# Patient Record
Sex: FEMALE | Race: WHITE | NOT HISPANIC OR LATINO | Employment: FULL TIME | URBAN - METROPOLITAN AREA
[De-identification: names, ages, dates, MRNs, and addresses within clinical notes are randomized per-mention and may not be internally consistent; named-entity substitution may affect disease eponyms.]

---

## 2023-03-15 DIAGNOSIS — E78.00 HYPERCHOLESTEROLEMIA: Primary | ICD-10-CM

## 2023-03-15 RX ORDER — EZETIMIBE 10 MG/1
1 TABLET ORAL NIGHTLY
COMMUNITY
Start: 2020-02-27 | End: 2023-03-15 | Stop reason: SDUPTHER

## 2023-03-15 RX ORDER — EZETIMIBE 10 MG/1
10 TABLET ORAL NIGHTLY
Qty: 90 TABLET | Refills: 0 | Status: SHIPPED | OUTPATIENT
Start: 2023-03-15 | End: 2023-05-30

## 2023-04-14 LAB
ALANINE AMINOTRANSFERASE (SGPT) (U/L) IN SER/PLAS: 25 U/L (ref 7–45)
ALBUMIN (G/DL) IN SER/PLAS: 4.4 G/DL (ref 3.4–5)
ALKALINE PHOSPHATASE (U/L) IN SER/PLAS: 69 U/L (ref 33–136)
ANION GAP IN SER/PLAS: 15 MMOL/L (ref 10–20)
ASPARTATE AMINOTRANSFERASE (SGOT) (U/L) IN SER/PLAS: 22 U/L (ref 9–39)
BILIRUBIN TOTAL (MG/DL) IN SER/PLAS: 0.5 MG/DL (ref 0–1.2)
CALCIDIOL (25 OH VITAMIN D3) (NG/ML) IN SER/PLAS: 41 NG/ML
CALCIUM (MG/DL) IN SER/PLAS: 10.1 MG/DL (ref 8.6–10.6)
CARBON DIOXIDE, TOTAL (MMOL/L) IN SER/PLAS: 26 MMOL/L (ref 21–32)
CHLORIDE (MMOL/L) IN SER/PLAS: 102 MMOL/L (ref 98–107)
CHOLESTEROL (MG/DL) IN SER/PLAS: 232 MG/DL (ref 0–199)
CHOLESTEROL IN HDL (MG/DL) IN SER/PLAS: 61.2 MG/DL
CHOLESTEROL/HDL RATIO: 3.8
COBALAMIN (VITAMIN B12) (PG/ML) IN SER/PLAS: 1166 PG/ML (ref 211–911)
CREATININE (MG/DL) IN SER/PLAS: 1.03 MG/DL (ref 0.5–1.05)
ERYTHROCYTE DISTRIBUTION WIDTH (RATIO) BY AUTOMATED COUNT: 12.1 % (ref 11.5–14.5)
ERYTHROCYTE MEAN CORPUSCULAR HEMOGLOBIN CONCENTRATION (G/DL) BY AUTOMATED: 32.7 G/DL (ref 32–36)
ERYTHROCYTE MEAN CORPUSCULAR VOLUME (FL) BY AUTOMATED COUNT: 102 FL (ref 80–100)
ERYTHROCYTES (10*6/UL) IN BLOOD BY AUTOMATED COUNT: 3.98 X10E12/L (ref 4–5.2)
ESTIMATED AVERAGE GLUCOSE FOR HBA1C: 120 MG/DL
GFR FEMALE: 60 ML/MIN/1.73M2
GLUCOSE (MG/DL) IN SER/PLAS: 107 MG/DL (ref 74–99)
HEMATOCRIT (%) IN BLOOD BY AUTOMATED COUNT: 40.4 % (ref 36–46)
HEMOGLOBIN (G/DL) IN BLOOD: 13.2 G/DL (ref 12–16)
HEMOGLOBIN A1C/HEMOGLOBIN TOTAL IN BLOOD: 5.8 %
LDL: 136 MG/DL (ref 0–99)
LEUKOCYTES (10*3/UL) IN BLOOD BY AUTOMATED COUNT: 4.4 X10E9/L (ref 4.4–11.3)
NRBC (PER 100 WBCS) BY AUTOMATED COUNT: 0 /100 WBC (ref 0–0)
PLATELETS (10*3/UL) IN BLOOD AUTOMATED COUNT: 301 X10E9/L (ref 150–450)
POTASSIUM (MMOL/L) IN SER/PLAS: 4.6 MMOL/L (ref 3.5–5.3)
PROTEIN TOTAL: 6.9 G/DL (ref 6.4–8.2)
SODIUM (MMOL/L) IN SER/PLAS: 138 MMOL/L (ref 136–145)
THYROTROPIN (MIU/L) IN SER/PLAS BY DETECTION LIMIT <= 0.05 MIU/L: 3.2 MIU/L (ref 0.44–3.98)
TRIGLYCERIDE (MG/DL) IN SER/PLAS: 173 MG/DL (ref 0–149)
UREA NITROGEN (MG/DL) IN SER/PLAS: 17 MG/DL (ref 6–23)
VLDL: 35 MG/DL (ref 0–40)

## 2023-05-03 ENCOUNTER — OFFICE VISIT (OUTPATIENT)
Dept: PRIMARY CARE | Facility: CLINIC | Age: 63
End: 2023-05-03
Payer: COMMERCIAL

## 2023-05-03 VITALS
DIASTOLIC BLOOD PRESSURE: 66 MMHG | SYSTOLIC BLOOD PRESSURE: 128 MMHG | HEART RATE: 72 BPM | WEIGHT: 161 LBS | BODY MASS INDEX: 26.82 KG/M2 | HEIGHT: 65 IN | RESPIRATION RATE: 18 BRPM

## 2023-05-03 DIAGNOSIS — Z71.2 ENCOUNTER TO DISCUSS TEST RESULTS: ICD-10-CM

## 2023-05-03 DIAGNOSIS — D75.89 MACROCYTOSIS: ICD-10-CM

## 2023-05-03 DIAGNOSIS — E03.8 OTHER SPECIFIED HYPOTHYROIDISM: ICD-10-CM

## 2023-05-03 DIAGNOSIS — Z83.3 FAMILY HISTORY OF DIABETES MELLITUS IN SISTER: ICD-10-CM

## 2023-05-03 DIAGNOSIS — E78.2 HYPERLIPIDEMIA, MIXED: ICD-10-CM

## 2023-05-03 DIAGNOSIS — F41.1 GENERALIZED ANXIETY DISORDER: ICD-10-CM

## 2023-05-03 DIAGNOSIS — E03.9 HYPOTHYROIDISM, UNSPECIFIED TYPE: ICD-10-CM

## 2023-05-03 DIAGNOSIS — I25.10 CORONARY ARTERY CALCIFICATION SEEN ON CAT SCAN: ICD-10-CM

## 2023-05-03 DIAGNOSIS — R73.09 ELEVATED HEMOGLOBIN A1C: Primary | ICD-10-CM

## 2023-05-03 DIAGNOSIS — E55.9 VITAMIN D DEFICIENCY: ICD-10-CM

## 2023-05-03 DIAGNOSIS — I10 PRIMARY HYPERTENSION: ICD-10-CM

## 2023-05-03 DIAGNOSIS — T46.6X5A ADVERSE EFFECT OF STATIN: ICD-10-CM

## 2023-05-03 DIAGNOSIS — E78.00 ELEVATED LDL CHOLESTEROL LEVEL: ICD-10-CM

## 2023-05-03 DIAGNOSIS — Z51.81 MEDICATION MONITORING ENCOUNTER: ICD-10-CM

## 2023-05-03 DIAGNOSIS — N95.2 ATROPHIC VAGINITIS: ICD-10-CM

## 2023-05-03 DIAGNOSIS — R73.01 IMPAIRED FASTING GLUCOSE: ICD-10-CM

## 2023-05-03 PROBLEM — M16.12 OSTEOARTHRITIS OF LEFT HIP: Status: ACTIVE | Noted: 2021-06-08

## 2023-05-03 PROBLEM — S83.242A TEAR OF MEDIAL MENISCUS OF LEFT KNEE, CURRENT: Status: RESOLVED | Noted: 2023-05-03 | Resolved: 2023-05-03

## 2023-05-03 PROBLEM — F17.201 NICOTINE DEPENDENCE IN REMISSION: Status: ACTIVE | Noted: 2023-05-03

## 2023-05-03 PROBLEM — E78.5 HYPERLIPIDEMIA: Status: ACTIVE | Noted: 2022-04-05

## 2023-05-03 PROBLEM — D72.819 LEUKOPENIA: Status: ACTIVE | Noted: 2023-05-03

## 2023-05-03 PROBLEM — R92.30 DENSE BREASTS: Status: ACTIVE | Noted: 2023-05-03

## 2023-05-03 PROBLEM — R92.1 BREAST CALCIFICATION, RIGHT: Status: ACTIVE | Noted: 2023-05-03

## 2023-05-03 PROBLEM — R53.83 FATIGUE: Status: RESOLVED | Noted: 2023-05-03 | Resolved: 2023-05-03

## 2023-05-03 PROBLEM — S23.41XA SPRAIN OF RIBS: Status: RESOLVED | Noted: 2023-05-03 | Resolved: 2023-05-03

## 2023-05-03 PROBLEM — I49.9 DISORDER OF HEART RHYTHM: Status: RESOLVED | Noted: 2023-05-03 | Resolved: 2023-05-03

## 2023-05-03 PROBLEM — F41.9 ANXIETY AND DEPRESSION: Status: RESOLVED | Noted: 2022-04-05 | Resolved: 2023-05-03

## 2023-05-03 PROBLEM — R45.82 WORRIES: Status: RESOLVED | Noted: 2023-05-03 | Resolved: 2023-05-03

## 2023-05-03 PROBLEM — K63.5 HYPERPLASTIC COLON POLYP: Status: RESOLVED | Noted: 2023-05-03 | Resolved: 2023-05-03

## 2023-05-03 PROBLEM — M54.42 ACUTE LEFT-SIDED LOW BACK PAIN WITH LEFT-SIDED SCIATICA: Status: RESOLVED | Noted: 2021-03-16 | Resolved: 2023-05-03

## 2023-05-03 PROBLEM — N60.19 FIBROCYSTIC BREAST DISEASE (FCBD) IN FEMALE: Status: ACTIVE | Noted: 2023-05-03

## 2023-05-03 PROBLEM — M19.90 ARTHRITIS: Status: RESOLVED | Noted: 2023-05-03 | Resolved: 2023-05-03

## 2023-05-03 PROBLEM — N83.201 OVARIAN CYST, RIGHT: Status: RESOLVED | Noted: 2023-05-03 | Resolved: 2023-05-03

## 2023-05-03 PROBLEM — J31.0 RHINITIS: Status: RESOLVED | Noted: 2023-05-03 | Resolved: 2023-05-03

## 2023-05-03 PROBLEM — D64.9 ANEMIA FOLLOWING SURGERY: Status: RESOLVED | Noted: 2023-05-03 | Resolved: 2023-05-03

## 2023-05-03 PROBLEM — F32.A ANXIETY AND DEPRESSION: Status: RESOLVED | Noted: 2022-04-05 | Resolved: 2023-05-03

## 2023-05-03 PROBLEM — M25.552 PAIN IN LEFT HIP: Status: RESOLVED | Noted: 2021-03-16 | Resolved: 2023-05-03

## 2023-05-03 PROBLEM — R92.2 DENSE BREASTS: Status: ACTIVE | Noted: 2023-05-03

## 2023-05-03 PROBLEM — D72.819 LEUKOPENIA: Status: RESOLVED | Noted: 2023-05-03 | Resolved: 2023-05-03

## 2023-05-03 PROBLEM — D64.9 ANEMIA FOLLOWING SURGERY: Status: ACTIVE | Noted: 2023-05-03

## 2023-05-03 PROBLEM — M79.2 ARM NEURALGIA: Status: RESOLVED | Noted: 2023-05-03 | Resolved: 2023-05-03

## 2023-05-03 PROBLEM — M54.50 LUMBAR PAIN: Status: RESOLVED | Noted: 2023-05-03 | Resolved: 2023-05-03

## 2023-05-03 PROBLEM — Z96.649 S/P HIP REPLACEMENT: Status: ACTIVE | Noted: 2023-05-03

## 2023-05-03 PROCEDURE — 3074F SYST BP LT 130 MM HG: CPT | Performed by: INTERNAL MEDICINE

## 2023-05-03 PROCEDURE — 3078F DIAST BP <80 MM HG: CPT | Performed by: INTERNAL MEDICINE

## 2023-05-03 PROCEDURE — 99214 OFFICE O/P EST MOD 30 MIN: CPT | Performed by: INTERNAL MEDICINE

## 2023-05-03 PROCEDURE — 1036F TOBACCO NON-USER: CPT | Performed by: INTERNAL MEDICINE

## 2023-05-03 RX ORDER — BUPROPION HYDROCHLORIDE 300 MG/1
1 TABLET ORAL DAILY
COMMUNITY
Start: 2013-02-25 | End: 2023-07-28 | Stop reason: SDUPTHER

## 2023-05-03 RX ORDER — PITAVASTATIN CALCIUM 1.04 MG/1
1 TABLET, FILM COATED ORAL DAILY
Qty: 30 TABLET | Refills: 3 | Status: SHIPPED | OUTPATIENT
Start: 2023-05-03 | End: 2023-05-03 | Stop reason: SDUPTHER

## 2023-05-03 RX ORDER — PITAVASTATIN CALCIUM 1.04 MG/1
1 TABLET, FILM COATED ORAL DAILY
Qty: 30 TABLET | Refills: 3 | Status: SHIPPED | OUTPATIENT
Start: 2023-05-03 | End: 2023-08-02 | Stop reason: SDUPTHER

## 2023-05-03 RX ORDER — ESTRADIOL 0.1 MG/G
2 CREAM VAGINAL DAILY
Qty: 42.5 G | Refills: 1 | Status: SHIPPED | OUTPATIENT
Start: 2023-05-03 | End: 2023-05-03 | Stop reason: SDUPTHER

## 2023-05-03 RX ORDER — LEVOTHYROXINE SODIUM 50 UG/1
50 TABLET ORAL DAILY
Qty: 90 TABLET | Refills: 3 | Status: SHIPPED | OUTPATIENT
Start: 2023-05-03

## 2023-05-03 RX ORDER — LISINOPRIL 10 MG/1
1 TABLET ORAL DAILY
COMMUNITY
Start: 2018-01-29 | End: 2023-05-26 | Stop reason: SDUPTHER

## 2023-05-03 RX ORDER — ESTRADIOL 0.1 MG/G
CREAM VAGINAL
Qty: 42.5 G | Refills: 1 | Status: SHIPPED | OUTPATIENT
Start: 2023-05-03

## 2023-05-03 RX ORDER — ESTRADIOL 0.1 MG/G
2 CREAM VAGINAL DAILY
COMMUNITY
End: 2023-05-03 | Stop reason: SDUPTHER

## 2023-05-03 RX ORDER — CYST/ALA/Q10/PHOS.SER/DHA/BROC 100-20-50
1 POWDER (GRAM) ORAL DAILY
COMMUNITY

## 2023-05-03 RX ORDER — VALACYCLOVIR HYDROCHLORIDE 1 G/1
2000 TABLET, FILM COATED ORAL 2 TIMES DAILY
COMMUNITY
Start: 2014-08-15 | End: 2024-01-22 | Stop reason: SDUPTHER

## 2023-05-03 RX ORDER — ESTRADIOL 0.1 MG/G
CREAM VAGINAL
Qty: 42.5 G | Refills: 1 | Status: SHIPPED | OUTPATIENT
Start: 2023-05-03 | End: 2023-05-03 | Stop reason: SDUPTHER

## 2023-05-03 RX ORDER — MULTIVITAMIN
1 TABLET ORAL DAILY
COMMUNITY

## 2023-05-03 ASSESSMENT — PAIN SCALES - GENERAL: PAINLEVEL: 0-NO PAIN

## 2023-05-03 NOTE — PROGRESS NOTES
"Subjective   Patient ID: Radha Roberts is a 62 y.o. female who presents for Follow-up (Pt here for follow up and review labs. Pt had labs done 2-3 weeks ago. Pt asking for refill of Estrace, but Rx needs to be edited d/t incorrect sig. ).    HPI     Here to follow up on htn, depression, inc chol. And lab review.  Mood is ok not depressed. Taking bupropion  Cardiac: No CP , palpitations, increased hanson  Resp: No sob, wheezing, cough  Extremities: No leg or ankle swelling** had trace with socks yesterday, sits at work, and is eating a lot of salt, no claudication  Neuro: No dizziness, syncope, blurred vision. No new headaches.     No vaginal bleeding or discharge.  Fbs 107  She does not have a dx of diabetes at this time.  Objective   /66 (BP Location: Left arm, Patient Position: Sitting, BP Cuff Size: Adult)   Pulse 72   Resp 18   Ht 1.638 m (5' 4.5\")   Wt 73 kg (161 lb)   BMI 27.21 kg/m²    Latest Reference Range & Units Most Recent   GLUCOSE 74 - 99 mg/dL 107 (H)  4/14/23 07:30   SODIUM 136 - 145 mmol/L 138  4/14/23 07:30   POTASSIUM 3.5 - 5.3 mmol/L 4.6  4/14/23 07:30   CHLORIDE 98 - 107 mmol/L 102  4/14/23 07:30   Bicarbonate 21 - 32 mmol/L 26  4/14/23 07:30   Anion Gap 10 - 20 mmol/L 15  4/14/23 07:30   Blood Urea Nitrogen 6 - 23 mg/dL 17  4/14/23 07:30   Creatinine 0.50 - 1.05 mg/dL 1.03  4/14/23 07:30   Calcium 8.6 - 10.6 mg/dL 10.1  4/14/23 07:30   Albumin 3.4 - 5.0 g/dL 4.4  4/14/23 07:30   Alkaline Phosphatase 33 - 136 U/L 69  4/14/23 07:30   ALT 7 - 45 U/L 25  4/14/23 07:30   AST 9 - 39 U/L 22  4/14/23 07:30   Bilirubin Total 0.0 - 1.2 mg/dL 0.5  4/14/23 07:30   HDL CHOLESTEROL mg/dL 61.2  4/14/23 07:30   Cholesterol/HDL Ratio  3.8  4/14/23 07:30   VLDL 0 - 40 mg/dL 35  4/14/23 07:30   TRIGLYCERIDES 0 - 149 mg/dL 173 (H)  4/14/23 07:30   Total Protein 6.4 - 8.2 g/dL 6.9  4/14/23 07:30   IRON 35 - 150 ug/dL 100  1/6/23 07:16   CHOLESTEROL 0 - 199 mg/dL 232 (H)  4/14/23 07:30   LDL 0 - 99 mg/dL " 136 (H)  4/14/23 07:30   TIBC 240 - 445 ug/dL 403  1/6/23 07:16   % Saturation 25 - 45 % 25  1/6/23 07:16   Vitamin B12 211 - 911 pg/mL 1,166 (H)  4/14/23 07:30   Hemoglobin A1C % 5.8 !  4/14/23 07:30   Thyroid Stimulating Hormone 0.44 - 3.98 mIU/L 3.20  4/14/23 07:30   Vitamin D, 25-Hydroxy, Total ng/mL 41  4/14/23 07:30   Estimated Average Glucose MG/  4/14/23 07:30   WBC 4.4 - 11.3 x10E9/L 4.4  4/14/23 07:30   RBC 4.00 - 5.20 x10E12/L 3.98 (L)  4/14/23 07:30   HEMOGLOBIN 12.0 - 16.0 g/dL 13.2  4/14/23 07:30   HEMATOCRIT 36.0 - 46.0 % 40.4  4/14/23 07:30   MCV 80 - 100 fL 102 (H)  4/14/23 07:30   MCHC 32.0 - 36.0 g/dL 32.7  4/14/23 07:30   Platelets 150 - 450 x10E9/L 301  4/14/23 07:30   nRBC 0.0 - 0.0 /100 WBC 0.0  4/14/23 07:30   RED CELL DISTRIBUTION WIDTH 11.5 - 14.5 % 12.1  4/14/23 07:30   CT LUNG SCREENING LOW DOSE  Rpt  12/9/22 14:11   GFR Female >90 mL/min/1.73m2 60  4/14/23 07:30     Lung ct December reviewed at jan visit and plans to done in dec again, already ordered. Stress test scheduled for July-no symptoms now  Could not get appt sooner she said and did wait to schedule  Mammo august.    Physical Exam    Patient looks well and is in no obvious distress.  HEENT:   Normocephalic, no facial asymmetry  Eyes: Sclera and conjunctiva are clear.  Neck: No adenopathy cervical (Ant/post/lat), no Supraclavicular nodes.   Thyroid normal.   Carotid pulses normal, no carotid bruits.  Lungs : RR normal. Clear to auscultation anterior, posterior and lateral. No rales, wheezes rhonchi or rubs. Good air exchange.  Heart: RRR. No Murmur, gallop, click or rub.  Vascular:  Posterior tibialis  pulses within normal limits bilaterally.   Extremities: no upper extremity edema. No lower extremity edema.   Musculoskeletal: no synovitis of joints seen. No new deformity.  Neuro: CN 2-12 intact. Alert, appropriate.   Ambulates independently.  No gross motor deficit.   No tremors.  Psych: normal mood and affect.  Skin: No  rash, bruising petechiae or jaundice.        Assessment/Plan   Diagnoses and all orders for this visit:  Elevated hemoglobin A1c not dm2, watch diet.  Atrophic vaginitis  -     estradiol (Estrace) 0.01 % (0.1 mg/gram) vaginal cream; Apply small amount externally to affected area once per day  Family history of diabetes mellitus in sister  Hyperlipidemia, mixed  -     pitavastatin calcium 1 mg tablet; Take 1 mg by mouth once daily.  -     Lipid Panel; Future  -     Aspartate Aminotransferase; Future  -     Alanine Aminotransferase; Future  -     CK; Future  Adverse effect of statin  -     pitavastatin calcium 1 mg tablet; Take 1 mg by mouth once daily.--she has not tried this medication, if issues with it as well could consider injectable med.  Coronary artery calcification seen on CAT scan  -     pitavastatin calcium 1 mg tablet; Take 1 mg by mouth once daily.  Encounter to discuss test results  Primary hypertension  -     pitavastatin calcium 1 mg tablet; Take 1 mg by mouth once daily.  Other specified hypothyroidism  Impaired fasting glucose  Vitamin D deficiency  Macrocytosis  Elevated LDL cholesterol level  Generalized anxiety disorder  Hypothyroidism, unspecified type---labs stable. Continue medication  -     levothyroxine (Synthroid, Levoxyl) 50 mcg tablet; Take 1 tablet (50 mcg) by mouth once daily.  Medication monitoring encounter  -     Lipid Panel; Future  -     Aspartate Aminotransferase; Future  -     Alanine Aminotransferase; Future  -     CK; Future    Pt plan:Estradiol cream is small amount pea sized externally approx once nightly.    Add livalo 1mg once per day. Call if any issues with this, continue zetia (ezetimibe).    Fasting blood test to check on livalo in 3 months.  Call if issues with the livalo.    Follow up 3 and 1/2 - 4 months.  Let us know if any issues with [facility]  Keep appts for tests you have scheduled in the summer and then the annual ct  scan of lungs in December.

## 2023-05-03 NOTE — PATIENT INSTRUCTIONS
Estradiol cream is small amount pea sized externally approx once nightly.    Add livalo 1mg once per day. Call if any issues with this, continue zetia (ezetimibe).    Fasting blood test to check on livalo in 3 months.  Call if issues with the livalo.    Follow up 3 and 1/2 - 4 months.  Let us know if any issues with the [facility].  Keep appts for tests you have scheduled in the summer and then the annual ct scan of lungs in December.

## 2023-05-08 ENCOUNTER — TELEPHONE (OUTPATIENT)
Dept: PRIMARY CARE | Facility: CLINIC | Age: 63
End: 2023-05-08
Payer: COMMERCIAL

## 2023-05-08 NOTE — TELEPHONE ENCOUNTER
Prior authorization has been submitted on behalf of this patient for Livalo 1 mg on 05/08/23 . Awaiting determination for status of PA request.    Key: [id number]    Patient will be contacted upon response of patient's prior authorization.     Chandu Aguilera, PharmD  [phone]

## 2023-05-10 ENCOUNTER — TELEPHONE (OUTPATIENT)
Dept: PHARMACY | Facility: HOSPITAL | Age: 63
End: 2023-05-10
Payer: COMMERCIAL

## 2023-05-10 NOTE — TELEPHONE ENCOUNTER
Radha FLEMING [employer] has been approved for prior authorization for Livalo 1 mg . Patient has been left a message regarding the status of their prior authorization.     Key: [id number]  Approval Duration: 04/08/2023  Date of expiration: 05/07/2024    A test claim for this prescription has been processed, the prescription would cost $25 for a 1 month supply (30 days).     Please contact clinical pharmacy with any further questions. Thank you.    Chandu Aguilera, PharmD  Clinical Pharmacist  [phone]    Continue all meds under the continuation of care with the referring provider and clinical pharmacy team.

## 2023-07-28 DIAGNOSIS — I25.10 CORONARY ARTERY CALCIFICATION SEEN ON CAT SCAN: ICD-10-CM

## 2023-07-28 DIAGNOSIS — E78.2 HYPERLIPIDEMIA, MIXED: ICD-10-CM

## 2023-07-28 DIAGNOSIS — I10 PRIMARY HYPERTENSION: ICD-10-CM

## 2023-07-28 DIAGNOSIS — F41.1 GENERALIZED ANXIETY DISORDER: ICD-10-CM

## 2023-07-28 DIAGNOSIS — T46.6X5A ADVERSE EFFECT OF STATIN: ICD-10-CM

## 2023-07-28 NOTE — TELEPHONE ENCOUNTER
This can wait until Monday and patient is aware.    Med refill    Bupropion 300 mg    1 tab by mouth once daily    [facility]    BN

## 2023-07-31 RX ORDER — BUPROPION HYDROCHLORIDE 300 MG/1
300 TABLET ORAL DAILY
Qty: 90 TABLET | Refills: 3 | Status: SHIPPED | OUTPATIENT
Start: 2023-07-31 | End: 2023-09-05 | Stop reason: SDUPTHER

## 2023-08-02 DIAGNOSIS — I10 PRIMARY HYPERTENSION: ICD-10-CM

## 2023-08-02 DIAGNOSIS — T46.6X5A ADVERSE EFFECT OF STATIN: ICD-10-CM

## 2023-08-02 DIAGNOSIS — E78.2 HYPERLIPIDEMIA, MIXED: ICD-10-CM

## 2023-08-02 DIAGNOSIS — I25.10 CORONARY ARTERY CALCIFICATION SEEN ON CAT SCAN: ICD-10-CM

## 2023-08-02 RX ORDER — PITAVASTATIN CALCIUM 1.04 MG/1
TABLET, FILM COATED ORAL
Refills: 0 | OUTPATIENT
Start: 2023-08-02

## 2023-08-02 RX ORDER — PITAVASTATIN CALCIUM 1.04 MG/1
1 TABLET, FILM COATED ORAL DAILY
Qty: 90 TABLET | Refills: 1 | Status: SHIPPED | OUTPATIENT
Start: 2023-08-02 | End: 2024-01-29

## 2023-09-04 NOTE — PROGRESS NOTES
Subjective   Patient ID: Radha Roberts is a 63 y.o. female who presents for Follow-up (Pt here for follow up visit. Pt did have a sprained ankle in July, but that is much better now. Pt went to  and was treated there. Pt also had some testing done).right ankle. Stepped in a hole in the dark, dog had dug the hole. Urgent care gave her an ace bandage and support to wear.  LAST VISIT PLAN 5/03/2023  PATIENT'S DISCUSSION/SUMMARY:  Estradiol cream is small amount pea sized externally approx once nightly.  Add livalo 1mg once per day. Call if any issues with this, continue zetia (ezetimibe).  Fasting blood test to check on livalo in 3 months.  Call if issues with the livalo.  Follow up 3 and 1/2 - 4 months.  Let us know if any issues with the [facility]  Keep appts for tests you have scheduled in the summer and then the annual ct scan of lungs in December.      PROVIDER'S IMPRESSIONS:  Elevated hemoglobin A1c  Atrophic vaginitis  Family history of diabetes mellitus in sister  Hyperlipidemia, mixed  Adverse effect of statin  Coronary artery calcification seen on CAT scan  Encounter to discuss test results  Primary hypertension  Other specified hypothyroidism  Impaired fasting glucose  Vitamin D deficiency  Macrocytosis  Elevated LDL cholesterol level  Generalized anxiety disorder  Hypothyroidism, unspecified type  Medication monitoring encounter  Orders Placed  Alanine Aminotransferase  Aspartate Aminotransferase  CK  Lipid Panel  Medication Changes  pitavastatin calcium 1 mg oral Daily  estradiol 0.01 % (0.1 mg/gram) Apply small amount externally to affected area once per day  END INFO FROM PRIOR VISIT    HPIPt went to  and was treated there. Pt also had some testing done).right ankle. Stepped in a hole in the dark, dog had dug the hole. Urgent care gave her an ace bandage and support to wear. Still some pain and swelling lateral.  Xray showed old distal fracture right fib and she  never had an issue with this ankle -will lauren xray     Going to see son in [place] soon. Elevation is ok, unless hikes high.  Had stress echo and it was ok. She reached 106% mphr  Lvef ok at rest  Is on the livalo, pharm d helped .  No issues with it.    Needs a correction on her work health form. Be well  Review of Systems    Cardiac: No CP , palpitations, increased hanson  Resp: No sob, wheezing, cough  Extremities: No leg or ankle swelling, no claudication  Neuro: No dizziness, syncope, blurred vision. No new headaches.      Mood is good.    Current Outpatient Medications   Medication Instructions    buPROPion XL (WELLBUTRIN XL) 300 mg, oral, Daily    cholecalciferol (Vitamin D-3) 50 mcg (2,000 unit) capsule TAKE 1 CAPSULE DAILY    estradiol (Estrace) 0.01 % (0.1 mg/gram) vaginal cream Apply small amount externally to affected area once per day    ezetimibe (Zetia) 10 mg tablet TAKE 1 TABLET DAILY AT BEDTIME    levothyroxine (SYNTHROID, LEVOXYL) 50 mcg, oral, Daily    lisinopril 10 mg, oral, Daily    lysine  mg capsule 1 tablet, oral, Daily    multivitamin tablet 1 tablet, oral, Daily    pitavastatin calcium 1 mg, oral, Daily    valACYclovir (VALTREX) 1,000 mg, oral, Daily     Objective   CONCLUSIONS:   - Technically difficult exam due to respiratory interference.   - Exam indication: Dyspnea on exertion, CAD     - The exercise stress echo was negative for ischemia at 106 % of MPHR (6.8 METS).   No regional wall motion abnormality seen at heart rate achieved.     - The left ventricle is normal in size. There is mild upper septal left   ventricular hypertrophy. Left ventricular systolic function is normal. EF = 68 ±   5% (2D biplane) Definity contrast used for endocardial border detection.   - There are no significant valvular abnormalities.     - The patient has not had a prior CC echocardiographic exam for comparison.       Electronically signed by Cristofer Mendez DO on 8/22/2023 at 11:03:30 AM     LV Ejection  "Fraction % 68 (2D biplane)              EF > 54  An LV Ejection Fraction of > 50% is normal   Resulting Agency  [facility]    Specimen Collected: 08/22/23 08:19       Lab Results   Component Value Date    WBC 4.4 04/14/2023    HGB 13.2 04/14/2023    HCT 40.4 04/14/2023     04/14/2023    CHOL 232 (H) 04/14/2023    TRIG 173 (H) 04/14/2023    HDL 61.2 04/14/2023    ALT 25 04/14/2023    AST 22 04/14/2023     04/14/2023    K 4.6 04/14/2023     04/14/2023    CREATININE 1.03 04/14/2023    BUN 17 04/14/2023    CO2 26 04/14/2023    TSH 3.20 04/14/2023    HGBA1C 5.8 (A) 04/14/2023     Physical ExamBP 108/52 (BP Location: Left arm, Patient Position: Sitting, BP Cuff Size: Adult)   Pulse 64   Resp 18   Ht 1.638 m (5' 4.5\")   Wt 70.8 kg (156 lb)   BMI 26.36 kg/m²   Patient looks well and is in no obvious distress.  HEENT:   Normocephalic, no facial asymmetry  Eyes: Sclera and conjunctiva are clear.  Neck: No adenopathy cervical (Ant/post/lat), no Supraclavicular nodes.   Thyroid normal.  Carotid pulses normal, no carotid bruits.  Lungs : RR normal. Clear to auscultation anterior, posterior and lateral. No rales, wheezes rhonchi or rubs. Good air exchange.  Heart: RRR. No  gallop, click or rub. Very faint barely a 1/6 systolic early/short murmur ursb only and no radiation. Echo just done and ok (minimal TR).  Vascular:  Posterior tibialis  pulses within normal limits bilaterally.   Extremities: no upper extremity edema. No lower extremity edema. Other than right lateral swelling of ankle and tenderness to touch there from incident 2 months ago. Will lauren xray to be sure was not a fx,   Musculoskeletal: no synovitis of joints seen. No new deformity.  Neuro: CN 2-12 intact. Alert, appropriate.  Ambulates independently.  No gross motor deficit.   No tremors.  Psych: normal mood and affect.  Skin: No rash, bruising petechiae or jaundice.      Radha was seen today for follow-up.  Diagnoses and all orders for " this visit:  Primary hypertension (Primary)  Generalized anxiety disorder  -     buPROPion XL (Wellbutrin XL) 300 mg 24 hr tablet; Take 1 tablet (300 mg) by mouth once daily. She is doing well with this.   Coronary artery disease involving native coronary artery of native heart without angina pectoris  Elevated LDL cholesterol level--needs labs to ck on livalo  Encounter for monitoring statin therapy  Need for pneumococcal 20-valent conjugate vaccination  -     pneumoc 20-juan pablo conj-dip cr,PF, (Prevnar) 0.5 mL vaccine; Inject 0.5 mL into the shoulder, thigh, or buttocks 1 time for 1 dose. Inject into deltoid.  Ankle injury, right, subsequent encounter  -     XR ankle right 3+ views; Future  Screening for osteoporosis  -     XR DEXA bone density; Future  Menopause  -     XR DEXA bone density; Future    Dexa if it is covered.  Prevnar 20 at pharmacy    Ankle-discussed wearing a support and ck xray  Discussed protecting from covid on plane, in airport.up to her  @[email or web address]@

## 2023-09-05 ENCOUNTER — OFFICE VISIT (OUTPATIENT)
Dept: PRIMARY CARE | Facility: CLINIC | Age: 63
End: 2023-09-05
Payer: COMMERCIAL

## 2023-09-05 VITALS
RESPIRATION RATE: 18 BRPM | HEIGHT: 65 IN | SYSTOLIC BLOOD PRESSURE: 108 MMHG | WEIGHT: 156 LBS | HEART RATE: 64 BPM | BODY MASS INDEX: 25.99 KG/M2 | DIASTOLIC BLOOD PRESSURE: 52 MMHG

## 2023-09-05 DIAGNOSIS — I10 PRIMARY HYPERTENSION: Primary | ICD-10-CM

## 2023-09-05 DIAGNOSIS — Z78.0 MENOPAUSE: ICD-10-CM

## 2023-09-05 DIAGNOSIS — S99.911D ANKLE INJURY, RIGHT, SUBSEQUENT ENCOUNTER: ICD-10-CM

## 2023-09-05 DIAGNOSIS — E78.00 ELEVATED LDL CHOLESTEROL LEVEL: ICD-10-CM

## 2023-09-05 DIAGNOSIS — F41.1 GENERALIZED ANXIETY DISORDER: ICD-10-CM

## 2023-09-05 DIAGNOSIS — Z13.820 SCREENING FOR OSTEOPOROSIS: ICD-10-CM

## 2023-09-05 DIAGNOSIS — Z51.81 ENCOUNTER FOR MONITORING STATIN THERAPY: ICD-10-CM

## 2023-09-05 DIAGNOSIS — Z23 NEED FOR PNEUMOCOCCAL 20-VALENT CONJUGATE VACCINATION: ICD-10-CM

## 2023-09-05 DIAGNOSIS — Z79.899 ENCOUNTER FOR MONITORING STATIN THERAPY: ICD-10-CM

## 2023-09-05 DIAGNOSIS — I25.10 CORONARY ARTERY DISEASE INVOLVING NATIVE CORONARY ARTERY OF NATIVE HEART WITHOUT ANGINA PECTORIS: ICD-10-CM

## 2023-09-05 PROBLEM — R91.1 LUNG NODULE: Status: ACTIVE | Noted: 2022-04-05

## 2023-09-05 PROCEDURE — 1159F MED LIST DOCD IN RCRD: CPT | Performed by: INTERNAL MEDICINE

## 2023-09-05 PROCEDURE — 3078F DIAST BP <80 MM HG: CPT | Performed by: INTERNAL MEDICINE

## 2023-09-05 PROCEDURE — 1160F RVW MEDS BY RX/DR IN RCRD: CPT | Performed by: INTERNAL MEDICINE

## 2023-09-05 PROCEDURE — 3074F SYST BP LT 130 MM HG: CPT | Performed by: INTERNAL MEDICINE

## 2023-09-05 PROCEDURE — 1036F TOBACCO NON-USER: CPT | Performed by: INTERNAL MEDICINE

## 2023-09-05 PROCEDURE — 1126F AMNT PAIN NOTED NONE PRSNT: CPT | Performed by: INTERNAL MEDICINE

## 2023-09-05 PROCEDURE — 99214 OFFICE O/P EST MOD 30 MIN: CPT | Performed by: INTERNAL MEDICINE

## 2023-09-05 RX ORDER — BUPROPION HYDROCHLORIDE 300 MG/1
300 TABLET ORAL DAILY
Qty: 90 TABLET | Refills: 3 | Status: SHIPPED | OUTPATIENT
Start: 2023-09-05

## 2023-09-05 ASSESSMENT — PAIN SCALES - GENERAL: PAINLEVEL: 0-NO PAIN

## 2023-09-05 NOTE — PATIENT INSTRUCTIONS
Second shingrix today.    Please get a prevnar 20 when you get your flu shot.    Consider updated covid booster this fall.    Same medications.    Fasting blood test to check on livalo soon.    Ankle xray right today or this week.  If covered by insurance, would get a screening bone densiy before next visit.    Annual schedule for January 2024.

## 2023-10-10 ENCOUNTER — TELEPHONE (OUTPATIENT)
Dept: PRIMARY CARE | Facility: CLINIC | Age: 63
End: 2023-10-10

## 2023-10-10 NOTE — TELEPHONE ENCOUNTER
Pt called and would like a referral to see someone for her nec. Pt stated she has been experiencing neck pain.  Pt # [phone]

## 2023-10-30 NOTE — TELEPHONE ENCOUNTER
LMOM for pt to call and leave detailed msg on nurse line with issues and concerns. Will call her again and then done this message.

## 2023-11-02 NOTE — TELEPHONE ENCOUNTER
"oJrge to the pt.  She states that the pain and clicking are still present.  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  She has been taking tylenol or motrin with incomplete relief.  Please advise; pt aware  out of the office this week.  jwrn  "

## 2023-11-08 DIAGNOSIS — I10 PRIMARY HYPERTENSION: ICD-10-CM

## 2023-11-08 RX ORDER — LISINOPRIL 10 MG/1
10 TABLET ORAL DAILY
Qty: 90 TABLET | Refills: 1 | Status: SHIPPED | OUTPATIENT
Start: 2023-11-08 | End: 2024-05-06

## 2023-11-27 DIAGNOSIS — E78.00 HYPERCHOLESTEROLEMIA: ICD-10-CM

## 2023-11-27 RX ORDER — EZETIMIBE 10 MG/1
TABLET ORAL
Qty: 90 TABLET | Refills: 1 | Status: SHIPPED | OUTPATIENT
Start: 2023-11-27

## 2023-11-27 NOTE — TELEPHONE ENCOUNTER
Patient approve of med refill    ezetimibe (Zetia) 10 mg tablet   : TAKE 1 TABLET DAILY AT BEDTIME     90 days, 3 refills    [facility]    BN

## 2023-12-13 ENCOUNTER — OFFICE VISIT (OUTPATIENT)
Dept: PRIMARY CARE | Facility: CLINIC | Age: 63
End: 2023-12-13
Payer: COMMERCIAL

## 2023-12-13 VITALS
SYSTOLIC BLOOD PRESSURE: 133 MMHG | HEIGHT: 65 IN | HEART RATE: 94 BPM | BODY MASS INDEX: 26.29 KG/M2 | WEIGHT: 157.8 LBS | DIASTOLIC BLOOD PRESSURE: 82 MMHG

## 2023-12-13 DIAGNOSIS — I10 PRIMARY HYPERTENSION: ICD-10-CM

## 2023-12-13 DIAGNOSIS — E78.00 ELEVATED LDL CHOLESTEROL LEVEL: ICD-10-CM

## 2023-12-13 DIAGNOSIS — M62.838 NECK MUSCLE SPASM: ICD-10-CM

## 2023-12-13 DIAGNOSIS — M54.2 POSTERIOR NECK PAIN: Primary | ICD-10-CM

## 2023-12-13 PROCEDURE — 1036F TOBACCO NON-USER: CPT | Performed by: INTERNAL MEDICINE

## 2023-12-13 PROCEDURE — 3075F SYST BP GE 130 - 139MM HG: CPT | Performed by: INTERNAL MEDICINE

## 2023-12-13 PROCEDURE — 1160F RVW MEDS BY RX/DR IN RCRD: CPT | Performed by: INTERNAL MEDICINE

## 2023-12-13 PROCEDURE — 99213 OFFICE O/P EST LOW 20 MIN: CPT | Performed by: INTERNAL MEDICINE

## 2023-12-13 PROCEDURE — 3079F DIAST BP 80-89 MM HG: CPT | Performed by: INTERNAL MEDICINE

## 2023-12-13 PROCEDURE — 1125F AMNT PAIN NOTED PAIN PRSNT: CPT | Performed by: INTERNAL MEDICINE

## 2023-12-13 PROCEDURE — 1159F MED LIST DOCD IN RCRD: CPT | Performed by: INTERNAL MEDICINE

## 2023-12-13 RX ORDER — TIZANIDINE HYDROCHLORIDE 4 MG/1
4 CAPSULE, GELATIN COATED ORAL NIGHTLY
Qty: 15 CAPSULE | Refills: 1 | Status: SHIPPED | OUTPATIENT
Start: 2023-12-13 | End: 2024-12-12

## 2023-12-13 RX ORDER — TIZANIDINE HYDROCHLORIDE 4 MG/1
4 CAPSULE, GELATIN COATED ORAL NIGHTLY
Qty: 15 CAPSULE | Refills: 1 | Status: SHIPPED | OUTPATIENT
Start: 2023-12-13 | End: 2023-12-13 | Stop reason: ENTERED-IN-ERROR

## 2023-12-13 RX ORDER — MELOXICAM 15 MG/1
15 TABLET ORAL DAILY
Qty: 21 TABLET | Refills: 0 | Status: SHIPPED | OUTPATIENT
Start: 2023-12-13 | End: 2023-12-13 | Stop reason: ENTERED-IN-ERROR

## 2023-12-13 RX ORDER — MELOXICAM 15 MG/1
15 TABLET ORAL DAILY
Qty: 21 TABLET | Refills: 0 | Status: SHIPPED | OUTPATIENT
Start: 2023-12-13 | End: 2024-12-12

## 2023-12-13 ASSESSMENT — PATIENT HEALTH QUESTIONNAIRE - PHQ9
1. LITTLE INTEREST OR PLEASURE IN DOING THINGS: NOT AT ALL
2. FEELING DOWN, DEPRESSED OR HOPELESS: NOT AT ALL
SUM OF ALL RESPONSES TO PHQ9 QUESTIONS 1 AND 2: 0

## 2023-12-13 ASSESSMENT — PAIN SCALES - GENERAL: PAINLEVEL: 2

## 2023-12-13 ASSESSMENT — ENCOUNTER SYMPTOMS
LOSS OF SENSATION IN FEET: 0
DEPRESSION: 0
OCCASIONAL FEELINGS OF UNSTEADINESS: 0

## 2023-12-13 NOTE — PROGRESS NOTES
"Subjective   Patient ID: Radha Roberts is a 63 y.o. female who presents for Follow-up (Patient here for neck discomfort, would like know where or who to see to help with the discomfort.).    HPI   Started sept. After she was here sept 5.   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  Right trap muscle hurts but mostly in the back of neck and it cracks    Does not keep her awake.  When bad it is a 4  Every day. Even if not working.    Has done a little stretching  Has taken ibp and ithelps. Tries to avoid. Taking it every few days. Also using icy hot.  It is there when she gets up in the am and worse at the end of the day.    All else is ok.  No radiation   No numbness or tingling    Discussed prevnar  Sometime this year  Reminded ct lung screen and fasting labs before jan appt.  Review of Systems  Cardiac: No CP , palpitations, increased hanson  Resp: No sob, wheezing, cough  Extremities: No leg or ankle swelling, no claudication  Neuro: No dizziness, syncope, blurred vision. No new headaches.    Objective      Lab Results   Component Value Date    WBC 4.4 04/14/2023    HGB 13.2 04/14/2023    HCT 40.4 04/14/2023     04/14/2023    CHOL 232 (H) 04/14/2023    TRIG 173 (H) 04/14/2023    HDL 61.2 04/14/2023    ALT 25 04/14/2023    AST 22 04/14/2023     04/14/2023    K 4.6 04/14/2023     04/14/2023    CREATININE 1.03 04/14/2023    BUN 17 04/14/2023    CO2 26 04/14/2023    TSH 3.20 04/14/2023    HGBA1C 5.8 (A) 04/14/2023         /82   Pulse 94   Ht 1.651 m (5' 5\")   Wt 71.6 kg (157 lb 12.8 oz)   BMI 26.26 kg/m²     Physical Exam  Musculoskeletal:        Arms:       Comments: Area of spasm       Patient looks well and is in no obvious distress.  HEENT:   Eyes: Sclera nonicteric,  conjunctiva clear. Pupils equal round.  Neck: No adenopathy cervical (Ant/post/lat), no Supraclavicular nodes.   Thyroid normal.   Carotid pulses normal, no carotid bruits.  Lungs : RR normal. Clear to auscultation " anterior, posterior and lateral. No rales, wheezes rhonchi or rubs. Good air exchange.  Heart: RRR. No Murmur, gallop, click or rub.  Extremities: no upper extremity edema. No lower extremity edema.   Neuro: CN 2-12 intact. Alert, appropriate.   Ambulates independently.  No gross motor deficit.   No tremors.  Psych: normal mood and affect.  Skin: No rash, bruising petechiae or jaundice.   Posterior cervical muscle spasm noted, not so much in the trapezius muscles bilaterally.  Posterior neck area more on the left than the right.  Mild kyphosis of c spine  Can turn head side to side and up and down.  Assessment/Plan

## 2023-12-13 NOTE — PATIENT INSTRUCTIONS
Start meloxicam, 1 tab with food regularly for 2 to 3 weeks.  Tizanidine muscle relaxant, take 1 every night for the next couple weeks.  It makes you too drowsy in the morning, then take it earlier in the evening and you do not have to take it a full 2 weeks if you do not need  it.      Do not take ibuprofen when taking meloxicam.  Wear a soft cervical collar when you are home, just to give those muscles a rest.  Schedule physical therapy for a few weeks out, as if you are better with this you been working overtime would not need to go.    Reminder to schedule CT scan for lung cancer screening, and also fasting blood test due before January 7 appt.

## 2024-01-18 ENCOUNTER — LAB (OUTPATIENT)
Dept: LAB | Facility: LAB | Age: 64
End: 2024-01-18
Payer: COMMERCIAL

## 2024-01-18 DIAGNOSIS — E78.2 HYPERLIPIDEMIA, MIXED: ICD-10-CM

## 2024-01-18 DIAGNOSIS — Z51.81 MEDICATION MONITORING ENCOUNTER: ICD-10-CM

## 2024-01-18 LAB
ALT SERPL W P-5'-P-CCNC: 22 U/L (ref 7–45)
AST SERPL W P-5'-P-CCNC: 22 U/L (ref 9–39)
CHOLEST SERPL-MCNC: 189 MG/DL (ref 0–199)
CHOLESTEROL/HDL RATIO: 2.5
CK SERPL-CCNC: 70 U/L (ref 0–215)
HDLC SERPL-MCNC: 75.4 MG/DL
LDLC SERPL CALC-MCNC: 88 MG/DL
NON HDL CHOLESTEROL: 114 MG/DL (ref 0–149)
TRIGL SERPL-MCNC: 130 MG/DL (ref 0–149)
VLDL: 26 MG/DL (ref 0–40)

## 2024-01-18 PROCEDURE — 84450 TRANSFERASE (AST) (SGOT): CPT

## 2024-01-18 PROCEDURE — 84460 ALANINE AMINO (ALT) (SGPT): CPT

## 2024-01-18 PROCEDURE — 36415 COLL VENOUS BLD VENIPUNCTURE: CPT

## 2024-01-18 PROCEDURE — 80061 LIPID PANEL: CPT

## 2024-01-18 PROCEDURE — 82550 ASSAY OF CK (CPK): CPT

## 2024-01-20 NOTE — PROGRESS NOTES
Subjective   Patient ID: Radha Roberts is a 63 y.o. female who presents for annual physical and follow up on conditions.  LAST VISIT PLAN 12/13/23  Instructions    Start meloxicam, 1 tab with food regularly for 2 to 3 weeks.  Tizanidine muscle relaxant, take 1 every night for the next couple weeks.  It makes you too drowsy in the morning, then take it earlier in the evening and you do not have to take it a full 2 weeks if you do not need  it.       Do not take ibuprofen when taking meloxicam.  Wear a soft cervical collar when you are home, just to give those muscles a rest.  Schedule physical therapy for a few weeks out, as if you are better with this you been working overtime would not need to go.     Reminder to schedule CT scan for lung cancer screening, and also fasting blood test due before January 7 appt.        END INFO FROM PRIOR VISIT  HPI  Health maintenance items last completed:  Colonoscopy: 2020 polyps  Mammogram: 8/21/23 cat 2 benign  Bone Density: 9/09/2019 AP Spine (L1-L4)  BMD: 1.062 T-score: 0.1 Z-score: 1.6 Classification: Normal  Femoral Neck (Left)  BMD: 0.734 T-score: -1.0 Z-score: 0.3 Classification: Normal  Total Hip (Left)  BMD: 0.846 T-score: -0.8 Z-score: 0.2 Classification: Normal  Urine albumin if HTN or DM2: 12/08/2021 10.0 mg/L 4.6 ug/mg crt 216.0 mg/dL  Pap: 10/03/2019 NEGATIVE FOR INTRAEPITHELIAL LESION OR MALIGNANCY. CELLULAR CHANGES CONSISTENT WITH ATROPHY. HPV: ALL NEGATIVE.   Vaccines due or not completed:  rsv, pneumococcal discussed . Will do prevnar 20 today, she can do rsv at pharmacy. She had flu and covid vaccines in the fall of 2023  Last Health Maintenance exam: 1/11/23    Here for annual check up and follow up on hld, htn, hypothyroidism, hx hsv, generalized anxiety disorder, med follow up and review of labs.  Lipids done 1/18/24, other labs due april  Ldl is 88 and much better ant trig are normal. Is on 1mg pitavastatin and zetia, keep as is. No issues with  statin.  Hyperlipidemia:  No myalgias, nausea, abdominal pain.Meds: Taking regularly, no adverse effect.  Labs:  LDL goal:close to 70 or under due to plaque in aorta, does not have plaque in cor.    Not smoking.  Neck is doing much better using orthopedic neck pillow and did take some of the tizanidine and it feels much better.  Also changed her work style to not look down as much.      Had uri over holidays , neg covid went to [facility] and they gave her atbs but she di dnot take them.  Feels better now. Mainly phlegm in her throat, not in her chest. Sinus. No fever. No nausea. It is gone now.    Wine is couple glasses on weekend with dtr.  Still not smoking, few years now. Is 30 pk years and participates in annual lung ca screening CT    Exercise: walks and occ arm weights  Motivation is an issue, looked at the gym but did not join.     Review of Systems  All systems reviewed and are negative unless otherwise stated in HPI or noted in writing on the written   7 page history and review of systems document reviewed by me and  scanned in with this visit.    Current Outpatient Medications   Medication Instructions    buPROPion XL (WELLBUTRIN XL) 300 mg, oral, Daily    cholecalciferol (Vitamin D-3) 50 mcg (2,000 unit) capsule TAKE 1 CAPSULE DAILY    estradiol (Estrace) 0.01 % (0.1 mg/gram) vaginal cream Apply small amount externally to affected area once per day    ezetimibe (Zetia) 10 mg tablet TAKE 1 TABLET DAILY AT BEDTIME    levothyroxine (SYNTHROID, LEVOXYL) 50 mcg, oral, Daily    lisinopril 10 mg, oral, Daily    lysine  mg capsule 1 tablet, oral, Daily    meloxicam (MOBIC) 15 mg, oral, Daily, With food    multivitamin tablet 1 tablet, oral, Daily    pitavastatin calcium 1 mg, oral, Daily    pneumoc 20-juan pablo conj-dip cr,PF, (Prevnar) 0.5 mL vaccine 0.5 mL, intramuscular, Once, Inject into deltoid.    tiZANidine (ZANAFLEX) 4 mg, oral, Nightly    valACYclovir (VALTREX) 2,000 mg, oral, 2 times  daily, Takes 2 tabs 12 hours apart for 2 doses prn episode.     Allergies   Allergen Reactions    Atorvastatin Other    Fluvastatin Other    Lovastatin Other    Pravastatin Dizziness and Other     Objective   Lab Results   Component Value Date    WBC 4.4 04/14/2023    HGB 13.2 04/14/2023    HCT 40.4 04/14/2023     04/14/2023    CHOL 189 01/18/2024    TRIG 130 01/18/2024    HDL 75.4 01/18/2024    ALT 22 01/18/2024    AST 22 01/18/2024     04/14/2023    K 4.6 04/14/2023     04/14/2023    CREATININE 1.03 04/14/2023    BUN 17 04/14/2023    CO2 26 04/14/2023    TSH 3.20 04/14/2023    HGBA1C 5.8 (A) 04/14/2023     Lab Results   Component Value Date    CHOL 189 01/18/2024    CHOL 232 (H) 04/14/2023    CHOL 218 (H) 03/25/2022     Lab Results   Component Value Date    HDL 75.4 01/18/2024    HDL 61.2 04/14/2023    HDL 59.7 03/25/2022     Lab Results   Component Value Date    LDLCALC 88 01/18/2024     Lab Results   Component Value Date    TRIG 130 01/18/2024    TRIG 173 (H) 04/14/2023    TRIG 184 (H) 03/25/2022   Aug 2023 mammogram  IMPRESSION:  1. The right breast calcifications are now considered benign after 2  years of stability. No additional follow-up for this finding is  recommended. Patient can return to annual screening.  2. No mammographic evidence of malignancy in either breast.  BI-RADS CATEGORY:  Category: 2 - Benign.  Recommendation: 1 Year Screening.      Cholesterol is much improved on current meds and diet.  Had mild aorta calcifications but no coronary calcifications on old ct.  Is former smoker, last ct chest screening dec 2022 with bronchiolitis type findings   === 12/09/22 ===CT LUNG SCREENING LOW DOSE  - Impression -  1.  Couple of pulmonary nodules measuring up to 4 mm as described  above. Recommend follow-up chest CT in 12 months.  2. Mild upper lung micronodularity, likely due to smoking-related  respiratory bronchiolitis. There is also mild subpleural  reticulation, likely due to  "chronic changes related to smoking.  3. Minimal coronary artery calcification.  LUNG RADS CATEGORY:  Lung-RADS 2,  (Benign Appearance or Behavior): Continue with annual  screening in 12 months,  CT Chest Lung Ca Screen Initial or  Follow up LR1/LR2/Continuation of Screening Low Dose recommended as  per American College of Radiology Guidelines Lung-RADS Version 1.1.  ------------------------------  [facility] updated.      Physical Exam  Chest:       Genitourinary:         Comments: Small urethral caruncle protruding, she has not been using the estradiol cream. Asymptomatic, lauren one year.      /58 (BP Location: Left arm, Patient Position: Sitting, BP Cuff Size: Adult)   Pulse 72   Resp 16   Ht 1.651 m (5' 5\")   Wt 73.9 kg (163 lb)   BMI 27.12 kg/m²     General: Patient is known to me, looks well, is in usual state of health, with  no obvious distress.  Head: normocephalic  Eyes: PERRL, EOMI, no scleral icterus or conjunctival abnormality  Ears:Normal canals and TM's  Oropharynx: Well hydrated mucosa. no lesions, erythema. palate moves well.  Neck: No masses, no cervical (A/P/ or Lateral) adenopathy. Thyroid not enlarged and no nodules. Carotid pulses normal and no bruits.  Chest: Normal AP diameter. No supraclavicular adenopathy.  Lungs: Clear to auscultation: no rales , wheezes, rhonchi, rubs, examined bilaterally, ant/post /lateral. RR normal.  Heart: RRR. No MGCR S1 S2 normal.  Abd: BS normal, no bruits. No hepatosplenomegaly. No abdominal mass or tenderness. No distension.  Extremities: No upper extremity edema. No lower leg edema.No ischemia.   Joints: no synovitis seen. No deformities.  Pulses: normal posterior tibialis pulses, normal dorsalis pedis pulses. normal radial pulses. Normal femoral pulses without bruits.  Neuro: CN 2-12 intact . Alert, oriented, appropriate.  No focal weakness observed. No tremors. Ambulation is normal .  Psych: Mood and affect normal. No cognitive deficits noted during " this exam. Alert, oriented, appropriate.  Skin: No rash, petechiae or excessive bruising.  Lymph: no SC axillary or inguinal adenopathy  Breast Exam : Symmetric appearance. No nipple discharge, skin retraction, axillary or supraclavicular adenopathy.  SEE DIAGRAM RE SMOOTH MOBILE NODULE LESS THAN 1CM MEDIAL BREAST JUST MEDIAL TO AREOLA BETWEEN  2 AND 3 OCLOCK.  Gyn: Normal pelvic exam: External Genitalia without lesions. Urethra CARUNCLE Speculum exam: no lesions or vaginal discharge, cervix without lesions. Bimanual exam: no uterine masses. No ovarian masses. No anal/perineal lesions. Clumped non inflamed or swollen hemohorrhoids external noted.Recto vaginal exam normal. (Pt declined chaperone)  Pap done      Assessment/Plan   Problem List Items Addressed This Visit       HTN (hypertension)    Relevant Orders    POCT Albumin Random Urine manually resulted (Completed)    Hypothyroidism    Relevant Orders    TSH with reflex to Free T4 if abnormal    Vitamin B12    Vitamin D deficiency    Relevant Orders    Vitamin D 25-Hydroxy,Total (for eval of Vitamin D levels)     Other Visit Diagnoses       Encounter for annual physical exam    -  Primary    Former smoker        Relevant Orders    CT lung screening low dose    Encounter for hepatitis C screening test for low risk patient        Blood tests for routine general physical examination        Relevant Orders    TSH with reflex to Free T4 if abnormal    Comprehensive Metabolic Panel    CBC and Auto Differential    Vitamin B12    Elevated hemoglobin A1c        Relevant Orders    Hemoglobin A1c    Pap smear for cervical cancer screening        would be last pap if hpv and pap both negative, 2024.    Relevant Orders    THINPREP PAP TEST    HPV DNA High Risk With Genotype    Screening for human papillomavirus (HPV)        Relevant Orders    THINPREP PAP TEST    HPV DNA High Risk With Genotype    Full code status        HSV-1 (herpes simplex virus 1) infection         "Relevant Medications    valACYclovir (Valtrex) 1 gram tablet    Mass of right breast, unspecified quadrant        Relevant Orders    BI mammo right diagnostic    BI US breast limited right    Referral to Breast Surgery    Abnormal mammogram of right breast        Urethral caruncle        estrace cream topical and recheck annually with pelvic exam    Need for Streptococcus pneumoniae vaccination        Relevant Orders    Pneumococcal conjugate vaccine, 20-valent (PREVNAR 20) (Completed)        Neck pain improved.  S/p hip replacement, no issues with it.  Annual  history and physical completed including  ROS, PE, review of chronic issues,   immunizations,   results of testing   and health maintenance.  Functionality and pain were assessed.   Cancer screening testing was addressed as appropriate-see patient discussion/orders.   Medications were discussed and reviewed/reconciled and refill need assessed/handled.   She had a hep c test previously, found this in allscripts and scanned to epic, [facility].  See wrap up section for patient instructions and  additional treatment plan.    \"If pap test and hpv test today are negative, you do not need anymore screening pap tests.   A Pelvic exam in one year is recommended to check the urethral area and an exam due to mom's history of possibly having had an ovarian cancer.    Please use a small amount of the estrogen cream externally at least 3 times per week: to help the urethral caruncle.    Small right breast mass was noted today, it has a benign feel to it but needs evaluated.  Schedule right breast ultrasound and diagnostic right mammogram soon.  Also schedule appt. With Dr Cande de la vega, within a month, [facility] location.    Blood test in later April : you do not have to fast but you should not take your thyroid pill or any vitamins that morning until after your blood is drawn. It is for your annual labs, just not the lipid panel as that was just done.    Cholesterol is much " "better on the livalo 1mg and the zetia, continue both.    Schedule CT scan chest for screening for lung cancer.    Glad  you for not smoking!    Bone density will do after you retire in 2025.    Follow up in 4 months to review labs, check on blood pressure and medications. Sooner if needed.    You had a pneumonia vaccine today: a Prevnar 20.\"        "

## 2024-01-22 ENCOUNTER — OFFICE VISIT (OUTPATIENT)
Dept: PRIMARY CARE | Facility: CLINIC | Age: 64
End: 2024-01-22
Payer: COMMERCIAL

## 2024-01-22 VITALS
HEART RATE: 72 BPM | DIASTOLIC BLOOD PRESSURE: 58 MMHG | HEIGHT: 65 IN | RESPIRATION RATE: 16 BRPM | SYSTOLIC BLOOD PRESSURE: 132 MMHG | WEIGHT: 163 LBS | BODY MASS INDEX: 27.16 KG/M2

## 2024-01-22 DIAGNOSIS — R73.09 ELEVATED HEMOGLOBIN A1C: ICD-10-CM

## 2024-01-22 DIAGNOSIS — E78.00 ELEVATED LDL CHOLESTEROL LEVEL: ICD-10-CM

## 2024-01-22 DIAGNOSIS — Z00.00 ENCOUNTER FOR ANNUAL PHYSICAL EXAM: Primary | ICD-10-CM

## 2024-01-22 DIAGNOSIS — Z12.4 PAP SMEAR FOR CERVICAL CANCER SCREENING: ICD-10-CM

## 2024-01-22 DIAGNOSIS — N63.10 MASS OF RIGHT BREAST, UNSPECIFIED QUADRANT: ICD-10-CM

## 2024-01-22 DIAGNOSIS — Z23 NEED FOR STREPTOCOCCUS PNEUMONIAE VACCINATION: ICD-10-CM

## 2024-01-22 DIAGNOSIS — Z00.00 BLOOD TESTS FOR ROUTINE GENERAL PHYSICAL EXAMINATION: ICD-10-CM

## 2024-01-22 DIAGNOSIS — N36.2 URETHRAL CARUNCLE: ICD-10-CM

## 2024-01-22 DIAGNOSIS — B00.9 HSV-1 (HERPES SIMPLEX VIRUS 1) INFECTION: ICD-10-CM

## 2024-01-22 DIAGNOSIS — E55.9 VITAMIN D DEFICIENCY: ICD-10-CM

## 2024-01-22 DIAGNOSIS — E03.9 HYPOTHYROIDISM, UNSPECIFIED TYPE: ICD-10-CM

## 2024-01-22 DIAGNOSIS — Z11.51 SCREENING FOR HUMAN PAPILLOMAVIRUS (HPV): ICD-10-CM

## 2024-01-22 DIAGNOSIS — Z78.9 FULL CODE STATUS: ICD-10-CM

## 2024-01-22 DIAGNOSIS — R92.8 ABNORMAL MAMMOGRAM OF RIGHT BREAST: ICD-10-CM

## 2024-01-22 DIAGNOSIS — Z87.891 FORMER SMOKER: ICD-10-CM

## 2024-01-22 DIAGNOSIS — I10 PRIMARY HYPERTENSION: ICD-10-CM

## 2024-01-22 PROBLEM — R06.09 DYSPNEA ON EXERTION: Status: RESOLVED | Noted: 2024-01-22 | Resolved: 2024-01-22

## 2024-01-22 PROBLEM — S99.911A INJURY OF RIGHT ANKLE: Status: RESOLVED | Noted: 2024-01-22 | Resolved: 2024-01-22

## 2024-01-22 PROBLEM — J21.9 BRONCHIOLITIS: Status: ACTIVE | Noted: 2024-01-22

## 2024-01-22 PROBLEM — I70.0 ATHEROSCLEROSIS OF AORTA (CMS-HCC): Status: ACTIVE | Noted: 2022-12-09

## 2024-01-22 PROBLEM — Z86.010 HISTORY OF COLONIC POLYPS: Status: ACTIVE | Noted: 2024-01-22

## 2024-01-22 PROBLEM — M62.838 MUSCLE SPASMS OF NECK: Status: RESOLVED | Noted: 2024-01-22 | Resolved: 2024-01-22

## 2024-01-22 PROBLEM — U07.1 DISEASE DUE TO SEVERE ACUTE RESPIRATORY SYNDROME CORONAVIRUS 2 (SARS-COV-2): Status: RESOLVED | Noted: 2022-12-09 | Resolved: 2024-01-22

## 2024-01-22 PROBLEM — F10.10 NONDEPENDENT ALCOHOL ABUSE, EPISODIC DRINKING BEHAVIOR: Status: RESOLVED | Noted: 2024-01-22 | Resolved: 2024-01-22

## 2024-01-22 PROBLEM — Z86.0100 HISTORY OF COLONIC POLYPS: Status: ACTIVE | Noted: 2024-01-22

## 2024-01-22 LAB
POC ALBUMIN /CREATININE RATIO MANUALLY ENTERED: <30 UG/MG CREAT
POC URINE ALBUMIN: 30 MG/L
POC URINE CREATININE: 300 MG/DL

## 2024-01-22 PROCEDURE — 99214 OFFICE O/P EST MOD 30 MIN: CPT | Performed by: INTERNAL MEDICINE

## 2024-01-22 PROCEDURE — 1170F FXNL STATUS ASSESSED: CPT | Performed by: INTERNAL MEDICINE

## 2024-01-22 PROCEDURE — 1159F MED LIST DOCD IN RCRD: CPT | Performed by: INTERNAL MEDICINE

## 2024-01-22 PROCEDURE — 3075F SYST BP GE 130 - 139MM HG: CPT | Performed by: INTERNAL MEDICINE

## 2024-01-22 PROCEDURE — 1036F TOBACCO NON-USER: CPT | Performed by: INTERNAL MEDICINE

## 2024-01-22 PROCEDURE — 82044 UR ALBUMIN SEMIQUANTITATIVE: CPT | Performed by: INTERNAL MEDICINE

## 2024-01-22 PROCEDURE — 88175 CYTOPATH C/V AUTO FLUID REDO: CPT

## 2024-01-22 PROCEDURE — 87624 HPV HI-RISK TYP POOLED RSLT: CPT

## 2024-01-22 PROCEDURE — 99397 PER PM REEVAL EST PAT 65+ YR: CPT | Performed by: INTERNAL MEDICINE

## 2024-01-22 PROCEDURE — 1160F RVW MEDS BY RX/DR IN RCRD: CPT | Performed by: INTERNAL MEDICINE

## 2024-01-22 PROCEDURE — 90677 PCV20 VACCINE IM: CPT | Performed by: INTERNAL MEDICINE

## 2024-01-22 PROCEDURE — 90471 IMMUNIZATION ADMIN: CPT | Performed by: INTERNAL MEDICINE

## 2024-01-22 PROCEDURE — 1126F AMNT PAIN NOTED NONE PRSNT: CPT | Performed by: INTERNAL MEDICINE

## 2024-01-22 PROCEDURE — 3078F DIAST BP <80 MM HG: CPT | Performed by: INTERNAL MEDICINE

## 2024-01-22 RX ORDER — VALACYCLOVIR HYDROCHLORIDE 1 G/1
2000 TABLET, FILM COATED ORAL 2 TIMES DAILY
Qty: 12 TABLET | Refills: 5 | Status: SHIPPED | OUTPATIENT
Start: 2024-01-22

## 2024-01-22 SDOH — ECONOMIC STABILITY: HOUSING INSECURITY
IN THE LAST 12 MONTHS, WAS THERE A TIME WHEN YOU DID NOT HAVE A STEADY PLACE TO SLEEP OR SLEPT IN A SHELTER (INCLUDING NOW)?: NO

## 2024-01-22 SDOH — ECONOMIC STABILITY: TRANSPORTATION INSECURITY
IN THE PAST 12 MONTHS, HAS THE LACK OF TRANSPORTATION KEPT YOU FROM MEDICAL APPOINTMENTS OR FROM GETTING MEDICATIONS?: NO

## 2024-01-22 SDOH — ECONOMIC STABILITY: GENERAL
WHICH OF THE FOLLOWING DO YOU KNOW HOW TO USE AND HAVE ACCESS TO EVERY DAY? (CHOOSE ALL THAT APPLY): DESKTOP COMPUTER, LAPTOP COMPUTER, OR TABLET WITH BROADBAND INTERNET CONNECTION;SMARTPHONE WITH CELLULAR DATA PLAN

## 2024-01-22 SDOH — ECONOMIC STABILITY: HOUSING INSECURITY: IN THE LAST 12 MONTHS, HOW MANY PLACES HAVE YOU LIVED?: 1

## 2024-01-22 SDOH — ECONOMIC STABILITY: INCOME INSECURITY: IN THE LAST 12 MONTHS, WAS THERE A TIME WHEN YOU WERE NOT ABLE TO PAY THE MORTGAGE OR RENT ON TIME?: NO

## 2024-01-22 SDOH — ECONOMIC STABILITY: TRANSPORTATION INSECURITY
IN THE PAST 12 MONTHS, HAS LACK OF TRANSPORTATION KEPT YOU FROM MEETINGS, WORK, OR FROM GETTING THINGS NEEDED FOR DAILY LIVING?: NO

## 2024-01-22 SDOH — ECONOMIC STABILITY: FOOD INSECURITY: WITHIN THE PAST 12 MONTHS, THE FOOD YOU BOUGHT JUST DIDN'T LAST AND YOU DIDN'T HAVE MONEY TO GET MORE.: NEVER TRUE

## 2024-01-22 SDOH — HEALTH STABILITY: PHYSICAL HEALTH: ON AVERAGE, HOW MANY DAYS PER WEEK DO YOU ENGAGE IN MODERATE TO STRENUOUS EXERCISE (LIKE A BRISK WALK)?: 3 DAYS

## 2024-01-22 SDOH — ECONOMIC STABILITY: GENERAL
WHICH OF THE FOLLOWING WOULD YOU LIKE TO GET CONNECTED TO IN ORDER TO RECEIVE A DISCOUNT OR FOR FREE? (CHOOSE ALL THAT APPLY): COMPUTER

## 2024-01-22 SDOH — ECONOMIC STABILITY: FOOD INSECURITY: WITHIN THE PAST 12 MONTHS, YOU WORRIED THAT YOUR FOOD WOULD RUN OUT BEFORE YOU GOT MONEY TO BUY MORE.: NEVER TRUE

## 2024-01-22 SDOH — HEALTH STABILITY: PHYSICAL HEALTH: ON AVERAGE, HOW MANY MINUTES DO YOU ENGAGE IN EXERCISE AT THIS LEVEL?: 30 MIN

## 2024-01-22 ASSESSMENT — COLUMBIA-SUICIDE SEVERITY RATING SCALE - C-SSRS
2. HAVE YOU ACTUALLY HAD ANY THOUGHTS OF KILLING YOURSELF?: NO
1. IN THE PAST MONTH, HAVE YOU WISHED YOU WERE DEAD OR WISHED YOU COULD GO TO SLEEP AND NOT WAKE UP?: NO
6. HAVE YOU EVER DONE ANYTHING, STARTED TO DO ANYTHING, OR PREPARED TO DO ANYTHING TO END YOUR LIFE?: NO

## 2024-01-22 ASSESSMENT — LIFESTYLE VARIABLES
HOW MANY STANDARD DRINKS CONTAINING ALCOHOL DO YOU HAVE ON A TYPICAL DAY: 3 OR 4
HOW OFTEN DO YOU HAVE A DRINK CONTAINING ALCOHOL: 2-4 TIMES A MONTH
AUDIT-C TOTAL SCORE: 3
SKIP TO QUESTIONS 9-10: 0
HOW OFTEN DO YOU HAVE SIX OR MORE DRINKS ON ONE OCCASION: NEVER

## 2024-01-22 ASSESSMENT — ANXIETY QUESTIONNAIRES
1. FEELING NERVOUS, ANXIOUS, OR ON EDGE: NOT AT ALL
3. WORRYING TOO MUCH ABOUT DIFFERENT THINGS: NOT AT ALL
2. NOT BEING ABLE TO STOP OR CONTROL WORRYING: NOT AT ALL
4. TROUBLE RELAXING: NOT AT ALL
7. FEELING AFRAID AS IF SOMETHING AWFUL MIGHT HAPPEN: NOT AT ALL
IF YOU CHECKED OFF ANY PROBLEMS ON THIS QUESTIONNAIRE, HOW DIFFICULT HAVE THESE PROBLEMS MADE IT FOR YOU TO DO YOUR WORK, TAKE CARE OF THINGS AT HOME, OR GET ALONG WITH OTHER PEOPLE: NOT DIFFICULT AT ALL
GAD7 TOTAL SCORE: 0
5. BEING SO RESTLESS THAT IT IS HARD TO SIT STILL: NOT AT ALL
6. BECOMING EASILY ANNOYED OR IRRITABLE: NOT AT ALL

## 2024-01-22 ASSESSMENT — SOCIAL DETERMINANTS OF HEALTH (SDOH)
IN A TYPICAL WEEK, HOW MANY TIMES DO YOU TALK ON THE PHONE WITH FAMILY, FRIENDS, OR NEIGHBORS?: MORE THAN THREE TIMES A WEEK
WITHIN THE LAST YEAR, HAVE YOU BEEN HUMILIATED OR EMOTIONALLY ABUSED IN OTHER WAYS BY YOUR PARTNER OR EX-PARTNER?: NO
HOW OFTEN DO YOU GET TOGETHER WITH FRIENDS OR RELATIVES?: MORE THAN THREE TIMES A WEEK
WITHIN THE LAST YEAR, HAVE TO BEEN RAPED OR FORCED TO HAVE ANY KIND OF SEXUAL ACTIVITY BY YOUR PARTNER OR EX-PARTNER?: NO
DO YOU BELONG TO ANY CLUBS OR ORGANIZATIONS SUCH AS CHURCH GROUPS UNIONS, FRATERNAL OR ATHLETIC GROUPS, OR SCHOOL GROUPS?: NO
HOW HARD IS IT FOR YOU TO PAY FOR THE VERY BASICS LIKE FOOD, HOUSING, MEDICAL CARE, AND HEATING?: NOT HARD AT ALL
WITHIN THE LAST YEAR, HAVE YOU BEEN AFRAID OF YOUR PARTNER OR EX-PARTNER?: NO
HOW OFTEN DO YOU ATTEND CHURCH OR RELIGIOUS SERVICES?: NEVER
ARE YOU MARRIED, WIDOWED, DIVORCED, SEPARATED, NEVER MARRIED, OR LIVING WITH A PARTNER?: NEVER MARRIED
HOW OFTEN DO YOU ATTENT MEETINGS OF THE CLUB OR ORGANIZATION YOU BELONG TO?: NEVER
IN THE PAST 12 MONTHS, HAS THE ELECTRIC, GAS, OIL, OR WATER COMPANY THREATENED TO SHUT OFF SERVICE IN YOUR HOME?: NO
WITHIN THE LAST YEAR, HAVE YOU BEEN KICKED, HIT, SLAPPED, OR OTHERWISE PHYSICALLY HURT BY YOUR PARTNER OR EX-PARTNER?: NO

## 2024-01-22 ASSESSMENT — ACTIVITIES OF DAILY LIVING (ADL)
EATING: INDEPENDENT
PATIENT'S MEMORY ADEQUATE TO SAFELY COMPLETE DAILY ACTIVITIES?: YES
PREPARING MEALS: INDEPENDENT
GROOMING: INDEPENDENT
MANAGING FINANCES: INDEPENDENT
BATHING: INDEPENDENT
HEARING - LEFT EAR: FUNCTIONAL
STIL DRIVING: YES
DRESSING YOURSELF: INDEPENDENT
WALKS IN HOME: INDEPENDENT
TOILETING: INDEPENDENT
GROCERY SHOPPING: INDEPENDENT
FEEDING YOURSELF: INDEPENDENT
ADEQUATE_TO_COMPLETE_ADL: YES
NEEDS ASSISTANCE WITH FOOD: INDEPENDENT
USING TELEPHONE: INDEPENDENT
USING TRANSPORTATION: INDEPENDENT
DOING HOUSEWORK: INDEPENDENT
HEARING - RIGHT EAR: FUNCTIONAL
JUDGMENT_ADEQUATE_SAFELY_COMPLETE_DAILY_ACTIVITIES: YES
PILL BOX USED: NO
TAKING MEDICATION: INDEPENDENT

## 2024-01-22 ASSESSMENT — PATIENT HEALTH QUESTIONNAIRE - PHQ9
1. LITTLE INTEREST OR PLEASURE IN DOING THINGS: NOT AT ALL
SUM OF ALL RESPONSES TO PHQ9 QUESTIONS 1 AND 2: 0
2. FEELING DOWN, DEPRESSED OR HOPELESS: NOT AT ALL

## 2024-01-22 ASSESSMENT — PAIN SCALES - GENERAL: PAINLEVEL: 0-NO PAIN

## 2024-01-22 NOTE — PATIENT INSTRUCTIONS
Thank you for coming in for your annual check up.  If pap test and hpv test today are negative, you do not need anymore screening pap tests.   A Pelvic exam in one year is recommended to check the urethral area and an exam due to mom's history of possibly having had an ovarian cancer.    Please use a small amount of the estrogen cream externally at least 3 times per week: to help the urethral caruncle.    Small right breast mass was noted today, it has a benign feel to it but needs evaluated.  Schedule right breast ultrasound and diagnostic right mammogram soon.  Also schedule appt. With Dr Cande de la vega, within a month, [facility] location.    Blood test in later April : you do not have to fast but you should not take your thyroid pill or any vitamins that morning until after your blood is drawn. It is for your annual labs, just not the lipid panel as that was just done.    Cholesterol is much better on the livalo 1mg and the zetia, continue both.    Schedule CT scan chest for screening for lung cancer.    Glad  you for not smoking!    Bone density will do after you retire in 2025.    Follow up in 4 months to review labs, check on blood pressure and medications. Sooner if needed.    You had a pneumonia vaccine today: a Prevnar 20.

## 2024-01-29 ENCOUNTER — HOSPITAL ENCOUNTER (OUTPATIENT)
Dept: RADIOLOGY | Facility: CLINIC | Age: 64
Discharge: HOME | End: 2024-01-29
Payer: COMMERCIAL

## 2024-01-29 DIAGNOSIS — T46.6X5A ADVERSE EFFECT OF STATIN: ICD-10-CM

## 2024-01-29 DIAGNOSIS — N63.10 MASS OF RIGHT BREAST, UNSPECIFIED QUADRANT: ICD-10-CM

## 2024-01-29 DIAGNOSIS — E78.2 HYPERLIPIDEMIA, MIXED: ICD-10-CM

## 2024-01-29 DIAGNOSIS — I10 PRIMARY HYPERTENSION: ICD-10-CM

## 2024-01-29 DIAGNOSIS — I25.10 CORONARY ARTERY CALCIFICATION SEEN ON CAT SCAN: ICD-10-CM

## 2024-01-29 PROCEDURE — 76642 ULTRASOUND BREAST LIMITED: CPT | Mod: RT

## 2024-01-29 PROCEDURE — G0279 TOMOSYNTHESIS, MAMMO: HCPCS | Mod: RIGHT SIDE | Performed by: STUDENT IN AN ORGANIZED HEALTH CARE EDUCATION/TRAINING PROGRAM

## 2024-01-29 PROCEDURE — 76642 ULTRASOUND BREAST LIMITED: CPT | Mod: RIGHT SIDE | Performed by: STUDENT IN AN ORGANIZED HEALTH CARE EDUCATION/TRAINING PROGRAM

## 2024-01-29 PROCEDURE — 77061 BREAST TOMOSYNTHESIS UNI: CPT | Mod: RT

## 2024-01-29 PROCEDURE — 77065 DX MAMMO INCL CAD UNI: CPT | Mod: RIGHT SIDE | Performed by: STUDENT IN AN ORGANIZED HEALTH CARE EDUCATION/TRAINING PROGRAM

## 2024-01-29 RX ORDER — PITAVASTATIN CALCIUM 1.04 MG/1
1 TABLET, FILM COATED ORAL DAILY
Qty: 90 TABLET | Refills: 3 | Status: SHIPPED | OUTPATIENT
Start: 2024-01-29

## 2024-01-29 NOTE — LETTER
June 27, 2024    Radha BERNADETTE Alejandra   [address] [address]     Dear Ms. Roberts     Our records indicate that you are due for your  breast imaging exam. Your last mammogram was done on 1/29/24. Please contact your personal physician for an order.    Please call to schedule this appointment at the number listed below. If you have already scheduled your appointment or had your exam, please disregard this letter.     Keep in mind that good breast care involves a combination of three important steps: monthly breast self-examination, an annual examination by a health care professional, and regular mammograms.     Current American College of Radiology Guidelines recommend screening mammograms every year beginning at the age of 40.     Clinical recommendations for follow-up breast imaging may not correspond to insurance payment guidelines. It is your responsibility to be aware of your personal insurance or Medicare requirements and to schedule accordingly. Please check with your insurance provider to determine your plan coverage for this examination.      Sincerely,    [phone]

## 2024-01-29 NOTE — LETTER
July 28, 2024     Radha BERNADETTE Alejandra  [address]  [facility] [address]     Dear MsKaveh Alejandra    Our records indicate that you are due for a breast-imaging exam.  Your last mammogram was done on 1/29/24. Please contact your personal physician for an order.     Please call to schedule this appointment, if you haven't already done so. If you have had the study done elsewhere, please let us know at your earliest convenience so that we can update our records.    Clinical recommendations for follow-up breast imaging may not correspond to insurance payment guidelines. It is your responsibility to be aware of your personal insurance or Medicare requirements and to schedule accordingly. You will be required to pay any expenses not covered by your insurance.     Thank you for choosing us for your health care needs.     Sincerely,    [phone]

## 2024-01-30 NOTE — TELEPHONE ENCOUNTER
----- Message from Ashia Meza MD sent at 1/29/2024  7:18 PM EST -----  Please call the patient regarding her result.  The breast imaging did see a mass. It is not a cyst but does not look like a cancer. They feel it is either a lipoma, fatty tumor or a hemangioma, which is a blood vessel collection.  HOWEVER, I would like her to see the breast surgeon for their opinion.   I had referred her previously but I do not see that she made an appointment, please assist her with getting an appointment with the specialist.    In addition to that visit, radiology suggests a repeat ultrasound and mammogram in 6 months.

## 2024-01-30 NOTE — RESULT ENCOUNTER NOTE
Please call the patient regarding her result.  The breast imaging did see a mass. It is not a cyst but does not look like a cancer. They feel it is either a lipoma, fatty tumor or a hemangioma, which is a blood vessel collection.  HOWEVER, I would like her to see the breast surgeon for their opinion.   I had referred her previously but I do not see that she made an appointment, please assist her with getting an appointment with the specialist.    In addition to that visit, radiology suggests a repeat ultrasound and mammogram in 6 months.

## 2024-02-05 NOTE — RESULT ENCOUNTER NOTE
Please call the patient regarding her result.  Pap and hpv test are negative. 
Pt aware of results and recommendations VIA [place].
No

## 2024-05-06 DIAGNOSIS — I10 PRIMARY HYPERTENSION: ICD-10-CM

## 2024-05-06 RX ORDER — LISINOPRIL 10 MG/1
10 TABLET ORAL DAILY
Qty: 30 TABLET | Refills: 0 | Status: SHIPPED | OUTPATIENT
Start: 2024-05-06 | End: 2024-06-06

## 2024-05-06 NOTE — TELEPHONE ENCOUNTER
Call patient, she was to get blood work in April and she was to schedule a 4 month follow up at her January visit-she has no follow up appt.  Have her schedule a follow up-next available is fine, and she should know about the blood work, fasting.  Instructions for obtaining lab tests:   You do not need a paper requisition when obtaining tests at a  facility. No appointment is needed for lab tests.  For fasting blood tests:  Please do not consume calories for 10-12 hours prior to this blood test. It is ok to drink water, you should be hydrated.  Please do not take vitamins, supplements or thyroid medication before your blood is drawn this day.   Most lab results should be available for your review on the  My Chart portal within 48 hours. Please contact the office if you have not received results within 2 weeks of obtaining the test.    I sent 30 tabs no refills until see results.

## 2024-06-06 NOTE — PROGRESS NOTES
Patient ID: Radha Roberts is a 64 y.o. female who presents for Follow-up (Pt here for follow up. Pt had labs today. ).  LAST VISIT PLAN FROM: 1/22/2024  Assessment/Plan   Problem List Items Addressed This Visit       HTN (hypertension)    Relevant Orders    POCT Albumin Random Urine manually resulted (Completed)    Hypothyroidism    Relevant Orders    TSH with reflex to Free T4 if abnormal    Vitamin B12    Vitamin D deficiency    Relevant Orders    Vitamin D 25-Hydroxy,Total (for eval of Vitamin D levels)     Other Visit Diagnoses       Encounter for annual physical exam    -  Primary    Former smoker        Relevant Orders    CT lung screening low dose    Encounter for hepatitis C screening test for low risk patient        Blood tests for routine general physical examination        Relevant Orders    TSH with reflex to Free T4 if abnormal    Comprehensive Metabolic Panel    CBC and Auto Differential    Vitamin B12    Elevated hemoglobin A1c        Relevant Orders    Hemoglobin A1c    Pap smear for cervical cancer screening        would be last pap if hpv and pap both negative, 2024.    Relevant Orders    THINPREP PAP TEST    HPV DNA High Risk With Genotype    Screening for human papillomavirus (HPV)        Relevant Orders    THINPREP PAP TEST    HPV DNA High Risk With Genotype    Full code status        HSV-1 (herpes simplex virus 1) infection        Relevant Medications    valACYclovir (Valtrex) 1 gram tablet    Mass of right breast, unspecified quadrant        Relevant Orders    BI mammo right diagnostic    BI US breast limited right    Referral to Breast Surgery    Abnormal mammogram of right breast        Urethral caruncle        estrace cream topical and recheck annually with pelvic exam    Need for Streptococcus pneumoniae vaccination        Relevant Orders    Pneumococcal conjugate vaccine, 20-valent (PREVNAR 20) (Completed)        Neck pain improved.  S/p hip replacement, no issues with it.  Annual   "history and physical completed including  ROS, PE, review of chronic issues,   immunizations,   results of testing   and health maintenance.  Functionality and pain were assessed.   Cancer screening testing was addressed as appropriate-see patient discussion/orders.   Medications were discussed and reviewed/reconciled and refill need assessed/handled.   She had a hep c test previously, found this in allscripts and scanned to epic, [facility].  See wrap up section for patient instructions and  additional treatment plan.  \"If pap test and hpv test today are negative, you do not need anymore screening pap tests.   A Pelvic exam in one year is recommended to check the urethral area and an exam due to mom's history of possibly having had an ovarian cancer.  Please use a small amount of the estrogen cream externally at least 3 times per week: to help the urethral caruncle.  Small right breast mass was noted today, it has a benign feel to it but needs evaluated.  Schedule right breast ultrasound and diagnostic right mammogram soon.  Also schedule appt. With Dr Cande de la vega, within a month, [facility] location.  Blood test in later April : you do not have to fast but you should not take your thyroid pill or any vitamins that morning until after your blood is drawn. It is for your annual labs, just not the lipid panel as that was just done.  Cholesterol is much better on the livalo 1mg and the zetia, continue both.  Schedule CT scan chest for screening for lung cancer.  Glad  you for not smoking!  Bone density will do after you retire in 2025.  Follow up in 4 months to review labs, check on blood pressure and medications. Sooner if needed.  You had a pneumonia vaccine today: a Prevnar 20.\"      END LAST VISIT PLAN  -------------------------------------------    HPI  Here to follow up on htn, hypothyroidism, review ct scan chest for lung ca screening, labs.  Last note reviewed.  She did not see the breast surgeon.  She did have " "the ultrasound 1/29/24 showing    \"IMPRESSION:  Probably benign right breast mass with differential diagnosis  including lipoma, hemangioma, or hemangiolipoma among other  etiologies. Short-term imaging follow-up is recommended with  ultrasound in six-months. Clinical follow-up is also recommended.    BI-RADS Category:  3 Probably Benign.  Recommendation:  Breast Ultrasound in 6 Months.  Recommended Date:  6 Months.  Laterality:  Right.\"  She was asked to see the breast surgeon for another opinion -she has not made that appt.  Ultrasound and dxic mammo ordered for July, again asked pt for breast surgeon second opinion.    Also here to review labs which have not been completed.    Scribe:  Patient is a 64-year-old female who presents today for review of most recent blood work.  Unfortunately, patient did not get her blood work completed until this morning prior to visit with results still pending.  She was due for blood work in 04/2024.  She is also here for hypertension follow up.    She reports no significant concerns today with plans to retire 06/2025.     Hypertension:  Patient continues on lisinopril 10 mg and requires refills.  We discussed that I will refill her prescription for 90 days once her labs are reviewed tomorrow.    Cardiac: No CP , palpitations, increased hanson  Resp: No sob, wheezing, cough  Extremities: No leg or ankle swelling, no claudication  Neuro: No dizziness, syncope, blurred vision. No new headaches.     Patient is walking for exercise and reports using the stairs at work instead of the elevator.    We reviewed all medications and updated her medication list with refills required.  I will send prescription renewals tomorrow once  labs are reviewed.  Patient has sufficient medication until that time.    Mass of right breast:  Previous US 01/29/2024 showed a mass of the right breast which appeared benign with 6-month follow up recommended for repeat US and mammogram.  Patient was to follow " with Dr. Bone but has not followed up to date.  We discussed the importance of following up.  Patient states that she is in a financial dilemma, currently, as she is still paying for her last US.  She reports upcoming extensive dental procedures which she would like to complete prior. Had abscess left upper jaw and needs implants has a bridge that keeps needing re glued. Is looking at [employer] to manage if less costly.   Order previously placed for US and mammogram still active. She is not sure she wants to do this due to cost and agreed to see dr de la vega to see if she needed to still do ultrasound and mamm end july    Vaginal atrophy:  Patient intermittently uses the Estrace cream.  Recommended using 3 times per week.      Needs 90 day refills on thyroid zetia bp meds.   Review of Systems  See ROS in HPI      Cardiac: No CP , palpitations, increased hanson  Resp: No sob, wheezing, cough  Extremities: No leg or ankle swelling, no claudication  Neuro: No dizziness, syncope, blurred vision. No new headaches.  She is not doing self breat erxams  Current Outpatient Medications   Medication Instructions    buPROPion XL (WELLBUTRIN XL) 300 mg, oral, Daily    cholecalciferol (Vitamin D-3) 50 mcg (2,000 unit) capsule TAKE 1 CAPSULE DAILY    estradiol (Estrace) 0.01 % (0.1 mg/gram) vaginal cream Apply small amount externally to affected area 3 times per week    ezetimibe (Zetia) 10 mg tablet TAKE 1 TABLET DAILY AT BEDTIME    levothyroxine (SYNTHROID, LEVOXYL) 50 mcg, oral, Daily    lisinopril 10 mg, oral, Daily    lysine  mg capsule 1 tablet, oral, Daily    multivitamin tablet 1 tablet, oral, Daily    pitavastatin calcium 1 mg tablet 1 tablet, oral, Daily    valACYclovir (VALTREX) 2,000 mg, oral, 2 times daily, Takes 2 tabs 12 hours apart for 2 doses prn episode.     Allergies   Allergen Reactions    Atorvastatin Other    Fluvastatin Other    Lovastatin Other    Pravastatin Dizziness and Other     Objective  Results   "reviewed:  Lab Results   Component Value Date    WBC 4.4 04/14/2023    HGB 13.2 04/14/2023    HCT 40.4 04/14/2023     04/14/2023    CHOL 189 01/18/2024    TRIG 130 01/18/2024    HDL 75.4 01/18/2024    ALT 22 01/18/2024    AST 22 01/18/2024     04/14/2023    K 4.6 04/14/2023     04/14/2023    CREATININE 1.03 04/14/2023    BUN 17 04/14/2023    CO2 26 04/14/2023    TSH 3.20 04/14/2023    HGBA1C 5.8 (A) 04/14/2023     Physical ExamBP 132/62 (BP Location: Right arm, Patient Position: Sitting, BP Cuff Size: Adult)   Pulse 72   Resp 18   Ht 1.6 m (5' 3\")   Wt 73.5 kg (162 lb)   BMI 28.70 kg/m²   Patient looks well and is in no obvious distress.  HEENT:   Normocephalic, no facial asymmetry  Eyes: Sclera and conjunctiva are clear.  Neck: No adenopathy cervical (Ant/post/lat), no Supraclavicular nodes.   Thyroid normal.   Carotid pulses normal, no carotid bruits.  Lungs : RR normal. Clear to auscultation anterior, posterior and lateral. No rales, wheezes rhonchi or rubs. Good air exchange.  Heart: RRR. No Murmur, gallop, click or rub.  Vascular:  Posterior tibialis  pulses within normal limits bilaterally.   Extremities: no upper extremity edema. No lower extremity edema.   Musculoskeletal: no synovitis of joints seen. No new deformity.  Neuro: CN 2-12 intact. Alert, appropriate.   Ambulates independently.  No gross motor deficit.   No tremors.  Psych: normal mood and affect.  Skin: No rash, bruising petechiae or jaundice.  No facial swelling.   Right breast shows small smooth mobile nodule well defined medial right breat exactly the same as January 2024 exam.    Radha was seen today for follow-up.  Diagnoses and all orders for this visit:  Abnormal mammogram of right breast (Primary)  -     BI mammo right diagnostic; Future  -     BI US breast limited right; Future  Atrophic vaginitis  -     estradiol (Estrace) 0.01 % (0.1 mg/gram) vaginal cream; Apply small amount externally to affected area 3 times " per week  Mass of right breast, unspecified quadrant  -     Referral to Breast Surgery; Future  Benign essential hypertension  Elevated LDL cholesterol level  Hypothyroidism due to Hashimoto's thyroiditis   She can use the estrace less often but should still use it. We removed the meloxicam and zanaflex as neck is better with change in her work station.  Problem List Items Addressed This Visit       Benign essential hypertension    Elevated LDL cholesterol level    Hypothyroidism     Other Visit Diagnoses       Abnormal mammogram of right breast    -  Primary    Relevant Orders    BI mammo right diagnostic    BI US breast limited right    Atrophic vaginitis        Relevant Medications    estradiol (Estrace) 0.01 % (0.1 mg/gram) vaginal cream    Mass of right breast, unspecified quadrant        Relevant Orders    Referral to Breast Surgery              See patient instructions in wrap up plan, orders and comments for treatment plan.  Patient Instructions:    Make an appt with Dr Mccarty, see if she thinks you need the mammogram and ultrasound end of July.  Will message or call about lab results and then plan to send refills assuming labs do not require a change in medication.    Follow up in the fall on meds and blood pressure, 4 months.    You do have orders for a right breast mammogram and ultrasound for the end of July.

## 2024-06-11 ENCOUNTER — LAB (OUTPATIENT)
Dept: LAB | Facility: LAB | Age: 64
End: 2024-06-11
Payer: COMMERCIAL

## 2024-06-11 ENCOUNTER — TELEPHONE (OUTPATIENT)
Dept: PRIMARY CARE | Facility: CLINIC | Age: 64
End: 2024-06-11

## 2024-06-11 ENCOUNTER — OFFICE VISIT (OUTPATIENT)
Dept: PRIMARY CARE | Facility: CLINIC | Age: 64
End: 2024-06-11
Payer: COMMERCIAL

## 2024-06-11 VITALS
HEIGHT: 63 IN | WEIGHT: 162 LBS | DIASTOLIC BLOOD PRESSURE: 62 MMHG | SYSTOLIC BLOOD PRESSURE: 132 MMHG | HEART RATE: 72 BPM | RESPIRATION RATE: 18 BRPM | BODY MASS INDEX: 28.7 KG/M2

## 2024-06-11 DIAGNOSIS — E78.00 HYPERCHOLESTEROLEMIA: ICD-10-CM

## 2024-06-11 DIAGNOSIS — I10 BENIGN ESSENTIAL HYPERTENSION: ICD-10-CM

## 2024-06-11 DIAGNOSIS — E03.9 HYPOTHYROIDISM, UNSPECIFIED TYPE: ICD-10-CM

## 2024-06-11 DIAGNOSIS — Z00.00 BLOOD TESTS FOR ROUTINE GENERAL PHYSICAL EXAMINATION: ICD-10-CM

## 2024-06-11 DIAGNOSIS — E06.3 HYPOTHYROIDISM DUE TO HASHIMOTO'S THYROIDITIS: ICD-10-CM

## 2024-06-11 DIAGNOSIS — E55.9 VITAMIN D DEFICIENCY: ICD-10-CM

## 2024-06-11 DIAGNOSIS — R92.8 ABNORMAL MAMMOGRAM OF RIGHT BREAST: Primary | ICD-10-CM

## 2024-06-11 DIAGNOSIS — F41.1 GENERALIZED ANXIETY DISORDER: ICD-10-CM

## 2024-06-11 DIAGNOSIS — E03.8 HYPOTHYROIDISM DUE TO HASHIMOTO'S THYROIDITIS: ICD-10-CM

## 2024-06-11 DIAGNOSIS — N95.2 ATROPHIC VAGINITIS: ICD-10-CM

## 2024-06-11 DIAGNOSIS — I10 PRIMARY HYPERTENSION: ICD-10-CM

## 2024-06-11 DIAGNOSIS — E78.00 ELEVATED LDL CHOLESTEROL LEVEL: ICD-10-CM

## 2024-06-11 DIAGNOSIS — R73.09 ELEVATED HEMOGLOBIN A1C: ICD-10-CM

## 2024-06-11 DIAGNOSIS — N63.10 MASS OF RIGHT BREAST, UNSPECIFIED QUADRANT: ICD-10-CM

## 2024-06-11 LAB
25(OH)D3 SERPL-MCNC: 46 NG/ML (ref 30–100)
ALBUMIN SERPL BCP-MCNC: 4.5 G/DL (ref 3.4–5)
ALP SERPL-CCNC: 71 U/L (ref 33–136)
ALT SERPL W P-5'-P-CCNC: 21 U/L (ref 7–45)
ANION GAP SERPL CALC-SCNC: 13 MMOL/L (ref 10–20)
AST SERPL W P-5'-P-CCNC: 21 U/L (ref 9–39)
BASOPHILS # BLD AUTO: 0.04 X10*3/UL (ref 0–0.1)
BASOPHILS NFR BLD AUTO: 0.8 %
BILIRUB SERPL-MCNC: 0.5 MG/DL (ref 0–1.2)
BUN SERPL-MCNC: 18 MG/DL (ref 6–23)
CALCIUM SERPL-MCNC: 9.7 MG/DL (ref 8.6–10.6)
CHLORIDE SERPL-SCNC: 100 MMOL/L (ref 98–107)
CO2 SERPL-SCNC: 28 MMOL/L (ref 21–32)
CREAT SERPL-MCNC: 0.82 MG/DL (ref 0.5–1.05)
EGFRCR SERPLBLD CKD-EPI 2021: 79 ML/MIN/1.73M*2
EOSINOPHIL # BLD AUTO: 0.15 X10*3/UL (ref 0–0.7)
EOSINOPHIL NFR BLD AUTO: 3 %
ERYTHROCYTE [DISTWIDTH] IN BLOOD BY AUTOMATED COUNT: 11.9 % (ref 11.5–14.5)
EST. AVERAGE GLUCOSE BLD GHB EST-MCNC: 111 MG/DL
GLUCOSE SERPL-MCNC: 134 MG/DL (ref 74–99)
HBA1C MFR BLD: 5.5 %
HCT VFR BLD AUTO: 39.1 % (ref 36–46)
HGB BLD-MCNC: 13.4 G/DL (ref 12–16)
IMM GRANULOCYTES # BLD AUTO: 0.01 X10*3/UL (ref 0–0.7)
IMM GRANULOCYTES NFR BLD AUTO: 0.2 % (ref 0–0.9)
LYMPHOCYTES # BLD AUTO: 1.48 X10*3/UL (ref 1.2–4.8)
LYMPHOCYTES NFR BLD AUTO: 29.7 %
MCH RBC QN AUTO: 34.7 PG (ref 26–34)
MCHC RBC AUTO-ENTMCNC: 34.3 G/DL (ref 32–36)
MCV RBC AUTO: 101 FL (ref 80–100)
MONOCYTES # BLD AUTO: 0.55 X10*3/UL (ref 0.1–1)
MONOCYTES NFR BLD AUTO: 11 %
NEUTROPHILS # BLD AUTO: 2.76 X10*3/UL (ref 1.2–7.7)
NEUTROPHILS NFR BLD AUTO: 55.3 %
NRBC BLD-RTO: 0 /100 WBCS (ref 0–0)
PLATELET # BLD AUTO: 300 X10*3/UL (ref 150–450)
POTASSIUM SERPL-SCNC: 4.2 MMOL/L (ref 3.5–5.3)
PROT SERPL-MCNC: 7.1 G/DL (ref 6.4–8.2)
RBC # BLD AUTO: 3.86 X10*6/UL (ref 4–5.2)
SODIUM SERPL-SCNC: 137 MMOL/L (ref 136–145)
TSH SERPL-ACNC: 3.57 MIU/L (ref 0.44–3.98)
VIT B12 SERPL-MCNC: 717 PG/ML (ref 211–911)
WBC # BLD AUTO: 5 X10*3/UL (ref 4.4–11.3)

## 2024-06-11 PROCEDURE — 84443 ASSAY THYROID STIM HORMONE: CPT

## 2024-06-11 PROCEDURE — 1160F RVW MEDS BY RX/DR IN RCRD: CPT | Performed by: INTERNAL MEDICINE

## 2024-06-11 PROCEDURE — 3075F SYST BP GE 130 - 139MM HG: CPT | Performed by: INTERNAL MEDICINE

## 2024-06-11 PROCEDURE — 99214 OFFICE O/P EST MOD 30 MIN: CPT | Performed by: INTERNAL MEDICINE

## 2024-06-11 PROCEDURE — 85025 COMPLETE CBC W/AUTO DIFF WBC: CPT

## 2024-06-11 PROCEDURE — 1123F ACP DISCUSS/DSCN MKR DOCD: CPT | Performed by: INTERNAL MEDICINE

## 2024-06-11 PROCEDURE — 36415 COLL VENOUS BLD VENIPUNCTURE: CPT

## 2024-06-11 PROCEDURE — 1158F ADVNC CARE PLAN TLK DOCD: CPT | Performed by: INTERNAL MEDICINE

## 2024-06-11 PROCEDURE — 82306 VITAMIN D 25 HYDROXY: CPT

## 2024-06-11 PROCEDURE — 3078F DIAST BP <80 MM HG: CPT | Performed by: INTERNAL MEDICINE

## 2024-06-11 PROCEDURE — 80053 COMPREHEN METABOLIC PANEL: CPT

## 2024-06-11 PROCEDURE — 83036 HEMOGLOBIN GLYCOSYLATED A1C: CPT

## 2024-06-11 PROCEDURE — 1126F AMNT PAIN NOTED NONE PRSNT: CPT | Performed by: INTERNAL MEDICINE

## 2024-06-11 PROCEDURE — 1159F MED LIST DOCD IN RCRD: CPT | Performed by: INTERNAL MEDICINE

## 2024-06-11 PROCEDURE — 1036F TOBACCO NON-USER: CPT | Performed by: INTERNAL MEDICINE

## 2024-06-11 PROCEDURE — 82607 VITAMIN B-12: CPT

## 2024-06-11 RX ORDER — LEVOTHYROXINE SODIUM 50 UG/1
50 TABLET ORAL DAILY
Qty: 90 TABLET | Refills: 3 | Status: SHIPPED | OUTPATIENT
Start: 2024-06-11

## 2024-06-11 RX ORDER — EZETIMIBE 10 MG/1
10 TABLET ORAL NIGHTLY
Qty: 90 TABLET | Refills: 3 | Status: SHIPPED | OUTPATIENT
Start: 2024-06-11

## 2024-06-11 RX ORDER — LISINOPRIL 10 MG/1
10 TABLET ORAL DAILY
Qty: 90 TABLET | Refills: 3 | Status: SHIPPED | OUTPATIENT
Start: 2024-06-11

## 2024-06-11 RX ORDER — BUPROPION HYDROCHLORIDE 300 MG/1
300 TABLET ORAL DAILY
Qty: 90 TABLET | Refills: 3 | Status: SHIPPED | OUTPATIENT
Start: 2024-06-11

## 2024-06-11 RX ORDER — ESTRADIOL 0.1 MG/G
CREAM VAGINAL
Qty: 42.5 G | Refills: 1 | Status: SHIPPED | OUTPATIENT
Start: 2024-06-11

## 2024-06-11 ASSESSMENT — PAIN SCALES - GENERAL: PAINLEVEL: 0-NO PAIN

## 2024-06-11 NOTE — RESULT ENCOUNTER NOTE
Hi Radha, your blood work looked good. I sent in all the refills. Blood test should be repeated in a year. Keep the same thyroid dose.

## 2024-06-11 NOTE — TELEPHONE ENCOUNTER
Lara Mccarty is no longer at [facility]. She is only at [facility] and [facility]. Patient does not want to drive that far. Asking if there is a local specialist she could be set up with.  Will call her back at [phone]

## 2024-06-11 NOTE — PATIENT INSTRUCTIONS
Make an appt with Dr Mccarty, see if she thinks you need the mammogram and ultrasound end of July.  Will message or call about lab results and then plan to send refills assuming labs do not require a change in medication.    Follow up in the fall on meds and blood pressure, 4 months.    You do have orders for a right breast mammogram and ultrasound for the end of July.

## 2024-06-11 NOTE — PROGRESS NOTES
Patient ID: Radha Roberts is a 64 y.o. female who presents for Follow-up (Pt here for follow up. Pt had labs today. ).  LAST VISIT PLAN FROM: 1/22/2024  Assessment/Plan   Problem List Items Addressed This Visit       HTN (hypertension)    Relevant Orders    POCT Albumin Random Urine manually resulted (Completed)    Hypothyroidism    Relevant Orders    TSH with reflex to Free T4 if abnormal    Vitamin B12    Vitamin D deficiency    Relevant Orders    Vitamin D 25-Hydroxy,Total (for eval of Vitamin D levels)     Other Visit Diagnoses       Encounter for annual physical exam    -  Primary    Former smoker        Relevant Orders    CT lung screening low dose    Encounter for hepatitis C screening test for low risk patient        Blood tests for routine general physical examination        Relevant Orders    TSH with reflex to Free T4 if abnormal    Comprehensive Metabolic Panel    CBC and Auto Differential    Vitamin B12    Elevated hemoglobin A1c        Relevant Orders    Hemoglobin A1c    Pap smear for cervical cancer screening        would be last pap if hpv and pap both negative, 2024.    Relevant Orders    THINPREP PAP TEST    HPV DNA High Risk With Genotype    Screening for human papillomavirus (HPV)        Relevant Orders    THINPREP PAP TEST    HPV DNA High Risk With Genotype    Full code status        HSV-1 (herpes simplex virus 1) infection        Relevant Medications    valACYclovir (Valtrex) 1 gram tablet    Mass of right breast, unspecified quadrant        Relevant Orders    BI mammo right diagnostic    BI US breast limited right    Referral to Breast Surgery    Abnormal mammogram of right breast        Urethral caruncle        estrace cream topical and recheck annually with pelvic exam    Need for Streptococcus pneumoniae vaccination        Relevant Orders    Pneumococcal conjugate vaccine, 20-valent (PREVNAR 20) (Completed)        Neck pain improved.  S/p hip replacement, no issues with it.  Annual   "history and physical completed including  ROS, PE, review of chronic issues,   immunizations,   results of testing   and health maintenance.  Functionality and pain were assessed.   Cancer screening testing was addressed as appropriate-see patient discussion/orders.   Medications were discussed and reviewed/reconciled and refill need assessed/handled.   She had a hep c test previously, found this in allscripts and scanned to epic, [facility].  See wrap up section for patient instructions and  additional treatment plan.  \"If pap test and hpv test today are negative, you do not need anymore screening pap tests.   A Pelvic exam in one year is recommended to check the urethral area and an exam due to mom's history of possibly having had an ovarian cancer.  Please use a small amount of the estrogen cream externally at least 3 times per week: to help the urethral caruncle.  Small right breast mass was noted today, it has a benign feel to it but needs evaluated.  Schedule right breast ultrasound and diagnostic right mammogram soon.  Also schedule appt. With Dr Cande de la vega, within a month, [facility] location.  Blood test in later April : you do not have to fast but you should not take your thyroid pill or any vitamins that morning until after your blood is drawn. It is for your annual labs, just not the lipid panel as that was just done.  Cholesterol is much better on the livalo 1mg and the zetia, continue both.  Schedule CT scan chest for screening for lung cancer.  Glad  you for not smoking!  Bone density will do after you retire in 2025.  Follow up in 4 months to review labs, check on blood pressure and medications. Sooner if needed.  You had a pneumonia vaccine today: a Prevnar 20.\"      END LAST VISIT PLAN  -------------------------------------------    HPI  Here to follow up on htn, hypothyroidism, review ct scan chest for lung ca screening, labs.  Last note reviewed.  She did not see the breast surgeon.  She did have " "the ultrasound 1/29/24 showing    \"IMPRESSION:  Probably benign right breast mass with differential diagnosis  including lipoma, hemangioma, or hemangiolipoma among other  etiologies. Short-term imaging follow-up is recommended with  ultrasound in six-months. Clinical follow-up is also recommended.    BI-RADS Category:  3 Probably Benign.  Recommendation:  Breast Ultrasound in 6 Months.  Recommended Date:  6 Months.  Laterality:  Right.\"  She was asked to see the breast surgeon for another opinion -she has not made that appt.  Ultrasound and dxic mammo ordered for July, again asked pt for breast surgeon second opinion.    Also here to review labs which have not been completed.    Scribe:  Patient is a 64-year-old female who presents today for review of most recent blood work.  Unfortunately, patient did not get her blood work completed until this morning prior to visit with results still pending.  She was due for blood work in 04/2024.  She is also here for hypertension follow up.    She reports no significant concerns today with plans to retire 06/2025.     Hypertension:  Patient continues on lisinopril 10 mg and requires refills.  We discussed that I will refill her prescription for 90 days once her labs are reviewed tomorrow.    Cardiac: No CP , palpitations, increased hanson  Resp: No sob, wheezing, cough  Extremities: No leg or ankle swelling, no claudication  Neuro: No dizziness, syncope, blurred vision. No new headaches.     Patient is walking for exercise and reports using the stairs at work instead of the elevator.    We reviewed all medications and updated her medication list with refills required.  I will send prescription renewals tomorrow once  labs are reviewed.  Patient has sufficient medication until that time.    Mass of right breast:  Previous US 01/29/2024 showed a mass of the right breast which appeared benign with 6-month follow up recommended for repeat US and mammogram.  Patient was to follow " with Dr. Bone but has not followed up to date.  We discussed the importance of following up.  Patient states that she is in a financial dilemma, currently, as she is still paying for her last US.  She reports upcoming extensive dental procedures which she would like to complete prior.  Order previously placed for US and mammogram still active.    Vaginal atrophy:  Patient intermittently uses the Estrace cream.  Recommended using 3 times per week.      Review of Systems  See ROS in HPI    Current Outpatient Medications   Medication Instructions    buPROPion XL (WELLBUTRIN XL) 300 mg, oral, Daily    cholecalciferol (Vitamin D-3) 50 mcg (2,000 unit) capsule TAKE 1 CAPSULE DAILY    estradiol (Estrace) 0.01 % (0.1 mg/gram) vaginal cream Apply small amount externally to affected area 3 times per week    ezetimibe (Zetia) 10 mg tablet TAKE 1 TABLET DAILY AT BEDTIME    levothyroxine (SYNTHROID, LEVOXYL) 50 mcg, oral, Daily    lisinopril 10 mg, oral, Daily    lysine  mg capsule 1 tablet, oral, Daily    multivitamin tablet 1 tablet, oral, Daily    pitavastatin calcium 1 mg tablet 1 tablet, oral, Daily    valACYclovir (VALTREX) 2,000 mg, oral, 2 times daily, Takes 2 tabs 12 hours apart for 2 doses prn episode.     Allergies   Allergen Reactions    Atorvastatin Other    Fluvastatin Other    Lovastatin Other    Pravastatin Dizziness and Other     Objective  Results  reviewed:  Lab Results   Component Value Date    WBC 4.4 04/14/2023    HGB 13.2 04/14/2023    HCT 40.4 04/14/2023     04/14/2023    CHOL 189 01/18/2024    TRIG 130 01/18/2024    HDL 75.4 01/18/2024    ALT 22 01/18/2024    AST 22 01/18/2024     04/14/2023    K 4.6 04/14/2023     04/14/2023    CREATININE 1.03 04/14/2023    BUN 17 04/14/2023    CO2 26 04/14/2023    TSH 3.20 04/14/2023    HGBA1C 5.8 (A) 04/14/2023     Physical ExamBP 132/62 (BP Location: Right arm, Patient Position: Sitting, BP Cuff Size: Adult)   Pulse 72   Resp 18   Ht 1.6 m  "(5' 3\")   Wt 73.5 kg (162 lb)   BMI 28.70 kg/m²   Patient looks well and is in no obvious distress.  HEENT:   Normocephalic, no facial asymmetry  Eyes: Sclera and conjunctiva are clear.  Neck: No adenopathy cervical (Ant/post/lat), no Supraclavicular nodes.   Thyroid normal.   Carotid pulses normal, no carotid bruits.  Lungs : RR normal. Clear to auscultation anterior, posterior and lateral. No rales, wheezes rhonchi or rubs. Good air exchange.  Heart: RRR. No Murmur, gallop, click or rub.  Abdomen: Bowel sounds normal, No bruits. No pulsatile mass. No hepatosplenomegaly, masses or tenderness. Soft, no guarding.  Vascular:  Posterior tibialis and dorsalis pedis pulses within normal limits bilaterally.   Extremities: no upper extremity edema. No lower extremity edema.   Musculoskeletal: no synovitis of joints seen. No new deformity.  Neuro: CN 2-12 intact. Alert, appropriate.   Ambulates independently.  No gross motor deficit.   No tremors.  Psych: normal mood and affect.  Skin: No rash, bruising petechiae or jaundice.    Radha was seen today for follow-up.  Diagnoses and all orders for this visit:  Abnormal mammogram of right breast (Primary)  -     BI mammo right diagnostic; Future  -     BI US breast limited right; Future  Atrophic vaginitis  -     estradiol (Estrace) 0.01 % (0.1 mg/gram) vaginal cream; Apply small amount externally to affected area 3 times per week  Mass of right breast, unspecified quadrant  -     Referral to Breast Surgery; Future     Problem List Items Addressed This Visit    None  Visit Diagnoses       Abnormal mammogram of right breast    -  Primary    Relevant Orders    BI mammo right diagnostic    BI US breast limited right    Atrophic vaginitis        Relevant Medications    estradiol (Estrace) 0.01 % (0.1 mg/gram) vaginal cream    Mass of right breast, unspecified quadrant        Relevant Orders    Referral to Breast Surgery              See patient instructions in wrap up plan, " orders and comments for treatment plan.  Patient Instructions:

## 2024-06-21 ENCOUNTER — APPOINTMENT (OUTPATIENT)
Dept: RADIOLOGY | Facility: CLINIC | Age: 64
End: 2024-06-21
Payer: COMMERCIAL

## 2024-07-09 ENCOUNTER — HOSPITAL ENCOUNTER (OUTPATIENT)
Dept: RADIOLOGY | Facility: CLINIC | Age: 64
Discharge: HOME | End: 2024-07-09
Payer: COMMERCIAL

## 2024-07-09 DIAGNOSIS — Z87.891 FORMER SMOKER: ICD-10-CM

## 2024-07-09 PROCEDURE — 71271 CT THORAX LUNG CANCER SCR C-: CPT

## 2024-07-11 DIAGNOSIS — F17.211 CIGARETTE NICOTINE DEPENDENCE IN REMISSION: Primary | ICD-10-CM

## 2024-07-11 NOTE — RESULT ENCOUNTER NOTE
Please call the patient regarding her result. CT lung screening:  No worrisome lung nodules they are stable=good.  Repeat ct chest one year.  There is some increased scarring -possibly from prior smoking, possibly from past covid infection.   Pulmonary function testing could be done to further evaluate this, but she did not have any complaints of cough wheezing or sob when here at her recent appt and I know she is concerned about cost of tests right now, so we can hold off on this and just repeat the ct chest in one year.  If she would develop  Shortness of breath then of course she could call .

## 2024-07-11 NOTE — PROGRESS NOTES
Follow ct screening which will also follow fibrosis, she di dhave covid in past , stopped smoking 2019. No sob at recent visit. Will repeat screening ct in one year. See note on 2024 ct.  Small plaque, aware, is on statin and bp meds.

## 2024-07-25 ENCOUNTER — APPOINTMENT (OUTPATIENT)
Dept: RADIOLOGY | Facility: CLINIC | Age: 64
End: 2024-07-25
Payer: COMMERCIAL

## 2024-10-03 NOTE — PROGRESS NOTES
Patient ID: Radha Roberts is a 64 y.o. female who presents for Follow-up (Radha is here for a follow up visit and medication review, no new concerns at this time.)  LAST VISIT PLAN FROM: 06/11/2024  Patient Instructions:  Mirtha BERNADETTE revised referral to Dr Saeed and faxed referral and reports to Dr Saeed's office.  Make an appt with Dr Mccarty, see if she thinks you need the mammogram and ultrasound end of July.  Will message or call about lab results and then plan to send refills assuming labs do not require a change in medication.  Follow up in the fall on meds and blood pressure, 4 months.  You do have orders for a right breast mammogram and ultrasound for the end of July.  END LAST VISIT PLAN  -------------------------------------------  HPI  Patient is a 64-year-old female who presents today for follow up.  Patient has no new concerns today.      CT lung screening was completed  07/09/2024 (See Objective).  We discussed her results.  Patient denies coughing or wheezing.  Recommended repeating CT lung screen in 1 year (07/2025).she is no longer smoking.    Abnormal Mammogram:  Patient has not seen Dr. Mccarty or Dr. Saeed.  She underwent US Breast on 01/29/2024 showing a probably benign right breast mass with differential diagnosis including lipoma, hemangioma, or hemangiolipoma among other etiologies. BI-RADS Category:  3 Probably Benign. Recommendation:  Breast Ultrasound in 6 Months. Short-term imaging follow-up is recommended with ultrasound in six-months. Clinical follow-up is also recommended.  A follow up diagnostic mammogram and ultrasound  had been previously ordered for 06/11/2024 which has not been completed.  Patient reports missing her appointment and thought that she needed another order placed. She denies any breast complaints.We will do a breast exam today in office.  Orders placed for repeat mammogram, bilateral breast and right breast US.     We discussed her recent blood work which was essentially  WNL.    Hypertension:  Follow up on cardiovascular conditions:  Cardiac:   Chest pain?No  Palpitations? No  Increased hanson?  No  Other:  Resp:   Shortness of breath?No  Wheezing No  Cough No  Other:  Extremities:   Leg or ankle swelling?No   Claudication?No  Other:  Neuro:  DizzinessNo  SyncopeNo   Blurred visionNo  New headaches No  Other:  Medications:   Current Outpatient Medications   Medication Instructions    buPROPion XL (WELLBUTRIN XL) 300 mg, oral, Daily    cholecalciferol (Vitamin D-3) 50 mcg (2,000 unit) capsule TAKE 1 CAPSULE DAILY    estradiol (Estrace) 0.01 % (0.1 mg/gram) vaginal cream Apply small amount externally to affected area 3 times per week    ezetimibe (ZETIA) 10 mg, oral, Nightly    levothyroxine (SYNTHROID, LEVOXYL) 50 mcg, oral, Daily    lisinopril 10 mg, oral, Daily    lysine  mg capsule 1 tablet, oral, Daily    multivitamin tablet 1 tablet, oral, Daily    pitavastatin calcium 1 mg tablet 1 tablet, oral, Daily    valACYclovir (VALTREX) 2,000 mg, oral, 2 times daily, Takes 2 tabs 12 hours apart for 2 doses prn episode.      Taking them regularly.Yes  Side effects.No    Hyperlipidemia:  No myalgias, nausea, abdominal pain.  Meds: Taking regularly, no adverse effect. Patient continues on pitavastatin 1 mg daily and Zetia 10 mg daily.  Labs: Last LDL calculated was 88 mg/dL on 01/18/2024.  LDL goal: <99 mg/dL.     Social History:  Patient reports still working.  She would like to work another year before long-term.  She is not smoking  We discussed recommended vaccines including RSV, Covid booster and influenza vaccine.    Follow up 02/19/2025.    Review of Systems  See ROS in HPI    Current Outpatient Medications   Medication Instructions    buPROPion XL (WELLBUTRIN XL) 300 mg, oral, Daily    cholecalciferol (Vitamin D-3) 50 mcg (2,000 unit) capsule TAKE 1 CAPSULE DAILY    estradiol (Estrace) 0.01 % (0.1 mg/gram) vaginal cream Apply small amount externally to affected area 3 times per  week    ezetimibe (ZETIA) 10 mg, oral, Nightly    levothyroxine (SYNTHROID, LEVOXYL) 50 mcg, oral, Daily    lisinopril 10 mg, oral, Daily    lysine  mg capsule 1 tablet, oral, Daily    multivitamin tablet 1 tablet, oral, Daily    pitavastatin calcium 1 mg tablet 1 tablet, oral, Daily    valACYclovir (VALTREX) 2,000 mg, oral, 2 times daily, Takes 2 tabs 12 hours apart for 2 doses prn episode.     Allergies   Allergen Reactions    Atorvastatin Other    Fluvastatin Other    Lovastatin Other    Pravastatin Dizziness and Other     Objective    Results reviewed:   CT Lung Screening 07/09/2024:  FINDINGS:  LUNGS AND AIRWAYS:  Mild generalized airway thickening in the absence of significant  bronchiectasis. There are mild interstitial changes with subpleural  and basilar predilection in the absence of any significant focal  architectural distortion which have overall increased as compared to  the prior study (compare for example the bilateral lung bases current  study series 205, image 267, prior study series 205, image 240) no  dense consolidation, mass or central airway obstruction. Mild  biapical postinflammatory scarring. Scant upper lung zone  emphysematous changes. Stable benign small nodular densities in the  right upper lobe and along the surface of the left major fissure  dating back to at least 11/24/2021, annotated for reference on series  205.  MEDIASTINUM AND ALPHONSE, LOWER NECK AND AXILLA:  No significantly enlarged intrathoracic lymph nodes. The esophagus is  nondilated. New or more conspicuous small hiatal hernia. No masses  are identified in the lower neck  HEART AND VESSELS:  Normal heart size. No pericardial effusion. Normal caliber thoracic  aorta and pulmonary trunk. Mild calcification in the left anterior  descending coronary artery.  UPPER ABDOMEN:  No acute upper abdominal finding is identified on this noncontrast  exam  CHEST WALL AND OSSEOUS STRUCTURES:  No significant soft tissue findings. No  aggressive osseous finding on  CT  IMPRESSION:  1. Stable benign small solid lung nodules. Airway thickening without  significant bronchiectasis. There has been progression of mild  basilar and subpleural predominant interstitial change in the absence  of significant architectural distortion, potentially smoking related  fibrosis. Advise attention on ongoing annual low-dose lung cancer  screening CT. Correlation with pulmonary function testing may be  obtained as clinically appropriate  2. Estimated coronary artery calcium score is 40*.  LUNG RADS CATEGORY:  Lung Rad: Lung-RADS 1, S (Negative)  Recommendation: Continue annual screening with Low Dose Chest CT in  12 months, recommended as per American College of [employer]  Guidelines Lung-RADS Version 2022. Lung-RADS S (Other non-nodular  findings) Management as appropriate to finding per American College  of Radiology Guidelines Lung-RADS Version 2022.  MACRO:  None  Signed by: Mateus Owen 7/10/2024 7:57 PM  Dictation workstation:   [id number]    Latest Complete Lab Results:  CBC:   Lab Results   Component Value Date    WBC 5.0 06/11/2024    RBC 3.86 (L) 06/11/2024    HGB 13.4 06/11/2024    HCT 39.1 06/11/2024     (H) 06/11/2024    MCH 34.7 (H) 06/11/2024    MCHC 34.3 06/11/2024     06/11/2024         CMP:  Lab Results   Component Value Date    GLUCOSE 134 (H) 06/11/2024     06/11/2024    K 4.2 06/11/2024     06/11/2024    CO2 28 06/11/2024    ANIONGAP 13 06/11/2024    BUN 18 06/11/2024    CREATININE 0.82 06/11/2024    GFRF 60 04/14/2023    CALCIUM 9.7 06/11/2024    ALBUMIN 4.5 06/11/2024    ALKPHOS 71 06/11/2024    PROT 7.1 06/11/2024    AST 21 06/11/2024    BILITOT 0.5 06/11/2024    ALT 21 06/11/2024          Lipid:   Lab Results   Component Value Date    CHOL 189 01/18/2024    HDL 75.4 01/18/2024    CHHDL 2.5 01/18/2024    LDLF 136 (H) 04/14/2023    VLDL 26 01/18/2024    TRIG 130 01/18/2024       TSH:   Lab Results   Component Value  "Date    TSH 3.57 06/11/2024        B12:  Lab Results   Component Value Date    LAQZRKYT65 717 06/11/2024       Vitamin D:  Lab Results   Component Value Date    VITD25 46 06/11/2024        HgA1c:   Lab Results   Component Value Date    HGBA1C 5.5 06/11/2024    LZJRHTML0A 111 06/11/2024       Physical Exam  Chest:           Vitals:      7/1/2023     9:47 AM 9/5/2023     8:49 AM 12/13/2023     8:17 AM 1/22/2024     9:08 AM 6/11/2024     8:08 AM 10/8/2024     8:13 AM 10/10/2024     8:54 AM   Vitals   Systolic 141 108 133 132 132 125    Diastolic 71 52 82 58 62 75    Heart Rate 88 64 94 72 72 94    Temp 36.6 °C (97.9 °F)         Resp 20 18  16 18 18    Height (in) 1.651 m (5' 5\") 1.638 m (5' 4.5\") 1.651 m (5' 5\") 1.651 m (5' 5\") 1.6 m (5' 3\") 1.6 m (5' 3\") 1.6 m (5' 2.99\")   Weight (lb) 158.07 156 157.8 163 162 165.6 165   BMI 26.3 kg/m2 26.36 kg/m2 26.26 kg/m2 27.12 kg/m2 28.7 kg/m2 29.33 kg/m2 29.24 kg/m2   BSA (m2) 1.81 m2 1.79 m2 1.81 m2 1.84 m2 1.81 m2 1.83 m2 1.82 m2   Visit Report  Report Report Report Report Report      Patient looks well and is in no obvious distress.  HEENT:   Normocephalic, no facial asymmetry  Eyes: Sclera and conjunctiva are clear.  Neck: No adenopathy cervical (Ant/post/lat), no Supraclavicular nodes.   Thyroid normal.  Carotid pulses normal, no carotid bruits.  Lungs : RR normal. Clear to auscultation anterior, posterior and lateral. No rales, wheezes rhonchi or rubs. Good air exchange.  Heart: RRR. No Murmur, gallop, click or rub.  Abdomen: Bowel sounds normal, No bruits. No pulsatile mass. No hepatosplenomegaly, masses or tenderness. Soft, no guarding.  Vascular:  Posterior tibialis pulses within normal limits, bilaterally.   Extremities: No upper extremity edema. No lower extremity edema.   Musculoskeletal: No synovitis of joints seen. No new deformity.  Neuro: CN 2-12 intact. Alert, appropriate.  Ambulates independently.  No gross motor deficit.   No tremors.  Psych: normal mood and " affect.  Skin: No rash, bruising petechiae or jaundice.    Breast Exam : Symmetric appearance. No  nipple discharge, skin retraction, axillary or supraclavicular adenopathy.  See diagram re smooth, mobile  soft less than 1 cm  superficial nodule medial breast just medial to areola between  2 and 3 o'clock.  Left breast WNL.similar to past exam June and January 2024. With stability presume benign but still needs follow up imaging. No masses otherwise right or left.    Radha was seen today for follow-up.  Diagnoses and all orders for this visit:  Abnormal mammogram of right breast (Primary)  Mass of right breast, unspecified quadrant  Comments:  palaption is similiar to prior exam , still needs follow up imaging.  Orders:  -     Cancel: BI mammo bilateral diagnostic; Future  -     BI US breast limited right; Future  Visit for screening mammogram  -     Cancel: BI mammo bilateral diagnostic; Future  Elevated LDL cholesterol level-labs June 11 2024 ok,   Primary hypertension stable on meds  Benign essential hypertension duplicate dx  Hypothyroidism due to Hashimoto thyroiditis: Reception please get her scheduled for diagnostic bilateral mammogram and right breast ultrasound at [facility] as soon as possible. This month.    Recommend rsv vaccine anytime in the next month.  Covid booster and flu next month as planned at work.    CT scan chest due next July.    Annual exam due February.  Immunization counseling  Encounter to discuss test results  History of nicotine dependence, stable not smoking, continue annual ct chest due next July.     Conditions stable, needs to get the [facility] wu breast imaging completed. Same meds. Ct chest stable. Repeat in a year.  Tsh ok June after visit here, same dose. Compliant with meds.  See patient instructions in wrap up plan, orders and comments for treatment plan.  Patient Instructions:  Reception please get her scheduled for diagnostic bilateral mammogram and right breast ultrasound  at [facility] as soon as possible. This month.    Recommend rsv vaccine anytime in the next month.  Covid booster and flu next month as planned at work.    CT scan chest due next July.    Annual exam due February.  Scribe Attestation  By signing my name below, Anne MORRISON Scribe   attest that this documentation has been prepared under the direction and in the presence of Ashia Meza MD.

## 2024-10-08 ENCOUNTER — APPOINTMENT (OUTPATIENT)
Dept: PRIMARY CARE | Facility: CLINIC | Age: 64
End: 2024-10-08
Payer: COMMERCIAL

## 2024-10-08 VITALS
OXYGEN SATURATION: 92 % | BODY MASS INDEX: 29.34 KG/M2 | DIASTOLIC BLOOD PRESSURE: 75 MMHG | WEIGHT: 165.6 LBS | SYSTOLIC BLOOD PRESSURE: 125 MMHG | HEART RATE: 94 BPM | HEIGHT: 63 IN | RESPIRATION RATE: 18 BRPM

## 2024-10-08 DIAGNOSIS — F17.211 CIGARETTE NICOTINE DEPENDENCE IN REMISSION: ICD-10-CM

## 2024-10-08 DIAGNOSIS — R92.8 ABNORMAL MAMMOGRAM OF RIGHT BREAST: Primary | ICD-10-CM

## 2024-10-08 DIAGNOSIS — Z71.85 IMMUNIZATION COUNSELING: ICD-10-CM

## 2024-10-08 DIAGNOSIS — Z71.2 ENCOUNTER TO DISCUSS TEST RESULTS: ICD-10-CM

## 2024-10-08 DIAGNOSIS — I10 PRIMARY HYPERTENSION: ICD-10-CM

## 2024-10-08 DIAGNOSIS — I10 BENIGN ESSENTIAL HYPERTENSION: ICD-10-CM

## 2024-10-08 DIAGNOSIS — E06.3 HYPOTHYROIDISM DUE TO HASHIMOTO THYROIDITIS: ICD-10-CM

## 2024-10-08 DIAGNOSIS — Z12.31 VISIT FOR SCREENING MAMMOGRAM: ICD-10-CM

## 2024-10-08 DIAGNOSIS — N63.10 MASS OF RIGHT BREAST, UNSPECIFIED QUADRANT: ICD-10-CM

## 2024-10-08 DIAGNOSIS — E78.00 ELEVATED LDL CHOLESTEROL LEVEL: ICD-10-CM

## 2024-10-08 PROCEDURE — 1160F RVW MEDS BY RX/DR IN RCRD: CPT | Performed by: INTERNAL MEDICINE

## 2024-10-08 PROCEDURE — 3078F DIAST BP <80 MM HG: CPT | Performed by: INTERNAL MEDICINE

## 2024-10-08 PROCEDURE — 1126F AMNT PAIN NOTED NONE PRSNT: CPT | Performed by: INTERNAL MEDICINE

## 2024-10-08 PROCEDURE — 3074F SYST BP LT 130 MM HG: CPT | Performed by: INTERNAL MEDICINE

## 2024-10-08 PROCEDURE — 3008F BODY MASS INDEX DOCD: CPT | Performed by: INTERNAL MEDICINE

## 2024-10-08 PROCEDURE — 99214 OFFICE O/P EST MOD 30 MIN: CPT | Performed by: INTERNAL MEDICINE

## 2024-10-08 PROCEDURE — 1159F MED LIST DOCD IN RCRD: CPT | Performed by: INTERNAL MEDICINE

## 2024-10-08 ASSESSMENT — PAIN SCALES - GENERAL: PAINLEVEL: 0-NO PAIN

## 2024-10-08 NOTE — PATIENT INSTRUCTIONS
Reception please get her scheduled for diagnostic bilateral mammogram and right breast ultrasound at [facility] as soon as possible. This month.    Recommend rsv vaccine anytime in the next month.  Covid booster and flu next month as planned at work.    CT scan chest due next July.    Annual exam due February.

## 2024-10-10 ENCOUNTER — HOSPITAL ENCOUNTER (OUTPATIENT)
Dept: RADIOLOGY | Facility: CLINIC | Age: 64
Discharge: HOME | End: 2024-10-10
Payer: COMMERCIAL

## 2024-10-10 ENCOUNTER — APPOINTMENT (OUTPATIENT)
Dept: RADIOLOGY | Facility: CLINIC | Age: 64
End: 2024-10-10
Payer: COMMERCIAL

## 2024-10-10 VITALS — BODY MASS INDEX: 29.23 KG/M2 | HEIGHT: 63 IN | WEIGHT: 165 LBS

## 2024-10-10 DIAGNOSIS — N63.10 MASS OF RIGHT BREAST, UNSPECIFIED QUADRANT: ICD-10-CM

## 2024-10-10 DIAGNOSIS — Z12.31 VISIT FOR SCREENING MAMMOGRAM: ICD-10-CM

## 2024-10-10 PROCEDURE — 76642 ULTRASOUND BREAST LIMITED: CPT | Mod: RT

## 2024-10-10 PROCEDURE — 77066 DX MAMMO INCL CAD BI: CPT

## 2024-10-13 NOTE — RESULT ENCOUNTER NOTE
Please call the patient regarding her result.  Mammogram and ultrasound show stable right breast mass likely a lipoma and she can repeat a mammogram and likely an ultrasound in one year.

## 2024-12-02 ENCOUNTER — TELEPHONE (OUTPATIENT)
Dept: PRIMARY CARE | Facility: CLINIC | Age: 64
End: 2024-12-02

## 2024-12-02 NOTE — TELEPHONE ENCOUNTER
Patient calling because her insurance co denied coverage for her ultrasound mammogram.   They said it was coded as an annual mammogram and not a diagnostic.   She would like this figured out because the bill is $1480    Radha  [phone]

## 2025-01-22 DIAGNOSIS — I10 PRIMARY HYPERTENSION: ICD-10-CM

## 2025-01-22 DIAGNOSIS — E78.2 HYPERLIPIDEMIA, MIXED: ICD-10-CM

## 2025-01-22 DIAGNOSIS — I25.10 CORONARY ARTERY CALCIFICATION SEEN ON CAT SCAN: ICD-10-CM

## 2025-01-22 DIAGNOSIS — B00.9 HSV-1 (HERPES SIMPLEX VIRUS 1) INFECTION: ICD-10-CM

## 2025-01-22 DIAGNOSIS — T46.6X5A ADVERSE EFFECT OF STATIN: ICD-10-CM

## 2025-01-23 DIAGNOSIS — I10 PRIMARY HYPERTENSION: ICD-10-CM

## 2025-01-23 RX ORDER — LISINOPRIL 10 MG/1
10 TABLET ORAL DAILY
Qty: 7 TABLET | Refills: 0 | Status: SHIPPED | OUTPATIENT
Start: 2025-01-23

## 2025-01-23 NOTE — TELEPHONE ENCOUNTER
Patient ask for 1 week supply because her mail order will not come until late nek week and she can not wait until Monday    Refill      lisinopril 10 mg tablet     DDM - Ralf Mcmanus

## 2025-01-29 DIAGNOSIS — E78.00 ELEVATED LDL CHOLESTEROL LEVEL: ICD-10-CM

## 2025-01-29 DIAGNOSIS — I10 BENIGN ESSENTIAL HYPERTENSION: ICD-10-CM

## 2025-01-29 DIAGNOSIS — Z51.81 ENCOUNTER FOR MONITORING STATIN THERAPY: Primary | ICD-10-CM

## 2025-01-29 DIAGNOSIS — E06.3 HYPOTHYROIDISM DUE TO HASHIMOTO'S THYROIDITIS: ICD-10-CM

## 2025-01-29 DIAGNOSIS — Z51.81 MEDICATION MONITORING ENCOUNTER: ICD-10-CM

## 2025-01-29 DIAGNOSIS — Z79.899 ENCOUNTER FOR MONITORING STATIN THERAPY: Primary | ICD-10-CM

## 2025-01-29 RX ORDER — PITAVASTATIN CALCIUM 1.04 MG/1
1 TABLET, FILM COATED ORAL DAILY
Qty: 90 TABLET | Refills: 2 | Status: SHIPPED | OUTPATIENT
Start: 2025-01-29

## 2025-01-29 RX ORDER — VALACYCLOVIR HYDROCHLORIDE 1 G/1
TABLET, FILM COATED ORAL
Qty: 12 TABLET | Refills: 1 | Status: SHIPPED | OUTPATIENT
Start: 2025-01-29

## 2025-01-29 RX ORDER — LISINOPRIL 10 MG/1
10 TABLET ORAL DAILY
Qty: 90 TABLET | Refills: 2 | Status: SHIPPED | OUTPATIENT
Start: 2025-01-29

## 2025-02-04 ENCOUNTER — TELEPHONE (OUTPATIENT)
Dept: PRIMARY CARE | Facility: CLINIC | Age: 65
End: 2025-02-04
Payer: COMMERCIAL

## 2025-02-04 NOTE — TELEPHONE ENCOUNTER
Spoke with Radha and reminded her of her upcoming Annual and active labs needing drawn prior to appt per Dr Meza's request. [facility] lab number provided to her for scheduling her labs. Lab requisitions printed out and will be filed at reception if she needs them.

## 2025-02-08 LAB
ALBUMIN SERPL-MCNC: 4.3 G/DL (ref 3.6–5.1)
ALP SERPL-CCNC: 61 U/L (ref 37–153)
ALT SERPL-CCNC: 26 U/L (ref 6–29)
ANION GAP SERPL CALCULATED.4IONS-SCNC: 9 MMOL/L (CALC) (ref 7–17)
AST SERPL-CCNC: 22 U/L (ref 10–35)
BILIRUB SERPL-MCNC: 0.4 MG/DL (ref 0.2–1.2)
BUN SERPL-MCNC: 20 MG/DL (ref 7–25)
CALCIUM SERPL-MCNC: 9.4 MG/DL (ref 8.6–10.4)
CHLORIDE SERPL-SCNC: 104 MMOL/L (ref 98–110)
CHOLEST SERPL-MCNC: 172 MG/DL
CHOLEST/HDLC SERPL: 2.7 (CALC)
CK SERPL-CCNC: 79 U/L (ref 20–243)
CO2 SERPL-SCNC: 26 MMOL/L (ref 20–32)
CREAT SERPL-MCNC: 0.79 MG/DL (ref 0.5–1.05)
EGFRCR SERPLBLD CKD-EPI 2021: 82 ML/MIN/1.73M2
GLUCOSE SERPL-MCNC: 100 MG/DL (ref 65–99)
HDLC SERPL-MCNC: 63 MG/DL
LDLC SERPL CALC-MCNC: 84 MG/DL (CALC)
NONHDLC SERPL-MCNC: 109 MG/DL (CALC)
POTASSIUM SERPL-SCNC: 4.3 MMOL/L (ref 3.5–5.3)
PROT SERPL-MCNC: 6.6 G/DL (ref 6.1–8.1)
SODIUM SERPL-SCNC: 139 MMOL/L (ref 135–146)
TRIGL SERPL-MCNC: 150 MG/DL
TSH SERPL-ACNC: 3.33 MIU/L (ref 0.4–4.5)

## 2025-02-15 NOTE — PROGRESS NOTES
Subjective   Patient ID: Radha Roberts is a 64 y.o. female who presents for Annual Physical and follow up on conditions.    LAST VISIT PLAN FROM: 10/08/2024:  Conditions stable, needs to get the follo wup breast imaging completed. Same meds. Ct chest stable. Repeat in a year.  Tsh ok June after visit here, same dose. Compliant with meds.  See patient instructions in wrap up plan, orders and comments for treatment plan.  Patient Instructions:  Reception please get her scheduled for diagnostic bilateral mammogram and right breast ultrasound at [facility] as soon as possible. This month.  Recommend rsv vaccine anytime in the next month.  Covid booster and flu next month as planned at work.  CT scan chest due next July.  Annual exam due February.  END INFO FROM PRIOR VISIT  -------------------------------------------  HPI  Health maintenance items last completed:  Colonoscopy: 2020 polyps.  Pathology 09/2020 (hyperplastic polyp)  Repeat colonoscopy due 09/2025.  Mammogram: 10/10/2024;IMPRESSION:  No mammographic evidence of malignancy.  Stable benign lipoma, 3 o'clock position right breast, requiring no additional dedicated ultrasound follow-up.  Routine annual screening mammography recommended. BI-RADS Category:  2 Benign.  Recommendation:  Annual Screening.  Due 10/10/2025.  Bone Density: 09/09/2019 Normal.  Due 09/09/2023, overdue.  Urine albumin if HTN or DM2: Albumin in urine was 30 on 01/22/2024.  Pap: 01/22/2024; normal and HPV negative.  Pelvic: 01/22/2024; small urethral caruncle, recheck in 1 year, 2025, today.  Vaccines due or not completed:  Prevnar 20 completed 01/22/2024.  Zoster completed.  T-dap due 08/03/2025.  Covid and influenza vaccines completed 11/06/2024.  Last Health Maintenance exam: 02/22/2024.    Since last visit, patient underwent US Right Breast and Mammogram (see Objective).    Patient brought paperwork with her today to be completed for her employer.      Patient denies any new concerns  or problems today.    I reviewed and updated her family medical history in the chart.     I discussed code status with the patient, and they understand the difference between full code and DNR.  Patient is full code.  I discussed HCPOA and LW. Her first HCPOA is her daughter, Shanta Sam.    We discussed Health Maintenance items due including colonoscopy (09/2025), mammogram (10/2025), and T-dap vaccine. (given today in office).    We discussed her latest blood work drawn 02/07/2025.    Rash and Itching:  Patient reports a rash on her arms which is intermittent but she states when it appears, she cannot stop scratching.  She states it used to be on her left arm, but now, it is on her right arm.  Last night, she had to take a Benadryl.    Hypertension:  BP today is 108/52.  Follow up on cardiovascular conditions:  Patient continues on lisinopril 10 mg daily. Continue same medications.  Cardiac:   Chest pain?No  Palpitations? No  Increased hanson?  No  Other:  Resp:   Shortness of breath?No  Wheezing No  Cough No  Other:  Extremities:   Leg or ankle swelling?No   Claudication?No  Other:  Neuro:  DizzinessNo  SyncopeNo   Blurred visionNo  New headaches No  Other:  Medications:Taking them regularly.Yes  Side effects.No    Hyperlipidemia:  No myalgias, nausea, abdominal pain.  Meds: Taking regularly, no adverse effect.  Patient continues on pitavastatin 1 mg daily and Zetia 10 mg daily.  Labs:  Last LDL was 84 mg/dL on 02/07/2025.  LDL goal: <70 mg/dL.  Mild calcification in the left anterior descending coronary artery.  Estimated coronary artery calcium score is 40 on CT Lung Screen 07/09/2024.  ASCVD risk 7%.    Lab Results   Component Value Date    CHOL 172 02/07/2025    CHOL 189 01/18/2024    CHOL 232 (H) 04/14/2023     Lab Results   Component Value Date    HDL 63 02/07/2025    HDL 75.4 01/18/2024    HDL 61.2 04/14/2023     Lab Results   Component Value Date    LDLCALC 84 02/07/2025    LDLCALC 88 01/18/2024     Lab  Results   Component Value Date    TRIG 150 (H) 02/07/2025    TRIG 130 01/18/2024    TRIG 173 (H) 04/14/2023       Patient reports little exercise but is planning on returning to the gym.  Recommended Mediterranean diet, exercise 30 minutes per day or more, include weight bearing exercise and flexibility/strengthening.  Keep regular sleep habits, 7 to 8 hours per night.  Hydrate 64 oz water per day.    Hypothyroidism:  Patient continues on levothyroxine 50 mcg daily.  Last TSH was 3.33, WNL, on 02/07/2025.    ADELSO:  Stable.  Patient continues on Wellbutrin  mg daily.    Orders placed for blood work as required (CBC and Vitamin D) to be completed in July when patient undergoes her CT Chest.    Continue same medications.    Follow up 08/19/2025.    Review of Systems  All systems reviewed and are negative unless otherwise stated in HPI or noted in writing on the written   3  page history and review of systems document reviewed by me and  scanned in with this visit. This is scanned either to notes or to media, with today's date.    Current Outpatient Medications   Medication Instructions    buPROPion XL (WELLBUTRIN XL) 300 mg, oral, Daily    cholecalciferol (Vitamin D-3) 50 mcg (2,000 unit) capsule TAKE 1 CAPSULE DAILY    clobetasol (Temovate) 0.05 % cream Topical, 2 times daily, To affected area of arm for up to 14 days    estradiol (Estrace) 0.01 % (0.1 mg/gram) vaginal cream Apply small amount externally to affected area 3 times per week    ezetimibe (ZETIA) 10 mg, oral, Nightly    levothyroxine (SYNTHROID, LEVOXYL) 50 mcg, oral, Daily    lisinopril 10 mg, oral, Daily    lysine  mg capsule 1 tablet, Daily    multivitamin tablet 1 tablet, Daily    pitavastatin calcium 1 mg tablet 1 tablet, oral, Daily    valACYclovir (Valtrex) 1 gram tablet TAKE 2 TABLETS TWICE A DAY 12 HOURS APART FOR 2 DOSES AS NEEDED FOR EPISODE     Allergies   Allergen Reactions    Atorvastatin Other    Fluvastatin Other    Lovastatin  Other    Pravastatin Dizziness and Other     Objective   The following test results have been reviewed:  US Right Breast 10/10/2024:  FINDINGS:  MAMMOGRAPHY: 2D and tomosynthesis images were reviewed at 1 mm slice  thickness.   Density:  The breasts are heterogeneously dense, which may obscure  small masses.  Stable coarse grouped calcifications in the deep upper outer right  breast, previously evaluated with spot magnification views and deemed  benign. No suspicious masses or calcifications are identified. There  is a stable benign-appearing fat lobule versus lipoma in the medial  right breast on mammogram.  ULTRASOUND: Targeted ultrasound was performed of the medial right  breast by a registered sonographer with elastography, as a follow-up  to exam 01/29/2024.  There is a stable oval circumscribed parallel superficial mass at the  3 o'clock position 2 cm from the nipple measuring 1.3 x 0.6 x 1.3 cm,  unchanged. The mass is homogeneously hyperechoic and lacks Doppler  blood flow. The findings are consistent with benign lipoma.  IMPRESSION:  No mammographic evidence of malignancy.   Stable benign lipoma, 3 o'clock position right breast, requiring no  additional dedicated ultrasound follow-up.  Routine annual screening mammography recommended.  BI-RADS CATEGORY:  BI-RADS Category:  2 Benign.  Recommendation:  Annual Screening.  Recommended Date:  1 Year.  Laterality:  Bilateral.  For any future breast imaging appointments, please call [phone]  ([phone]).  MACRO:  None  Signed by: Misti Otero 10/10/2024 10:15 AM  Dictation workstation:   [id number]    Mammogram 10/10/2024:  FINDINGS:  MAMMOGRAPHY: 2D and tomosynthesis images were reviewed at 1 mm slice  thickness.  Density:  The breasts are heterogeneously dense, which may obscure  small masses.  Stable coarse grouped calcifications in the deep upper outer right  breast, previously evaluated with spot magnification views and deemed  benign. No suspicious masses  or calcifications are identified. There  is a stable benign-appearing fat lobule versus lipoma in the medial  right breast on mammogram.  ULTRASOUND: Targeted ultrasound was performed of the medial right  breast by a registered sonographer with elastography, as a follow-up  to exam 01/29/2024.  There is a stable oval circumscribed parallel superficial mass at the  3 o'clock position 2 cm from the nipple measuring 1.3 x 0.6 x 1.3 cm,  unchanged. The mass is homogeneously hyperechoic and lacks Doppler  blood flow. The findings are consistent with benign lipoma.  IMPRESSION:  No mammographic evidence of malignancy.    Stable benign lipoma, 3 o'clock position right breast, requiring no  additional dedicated ultrasound follow-up.   Routine annual screening mammography recommended.   BI-RADS CATEGORY:  BI-RADS Category:  2 Benign.  Recommendation:  Annual Screening.  Recommended Date:  1 Year.  Laterality:  Bilateral.   For any future breast imaging appointments, please call [phone]).  MACRO:  None  Signed by: Misti Otero 10/10/2024 10:15 AM  Dictation workstation:   [id number]    Latest Complete Lab Results:  CBC:   Lab Results   Component Value Date    WBC 5.0 06/11/2024    RBC 3.86 (L) 06/11/2024    HGB 13.4 06/11/2024    HCT 39.1 06/11/2024     (H) 06/11/2024    MCH 34.7 (H) 06/11/2024    MCHC 34.3 06/11/2024     06/11/2024       CBC w/Diff:   Lab Results   Component Value Date    WBC 5.0 06/11/2024    NRBC 0.0 06/11/2024    RBC 3.86 (L) 06/11/2024    HGB 13.4 06/11/2024    HCT 39.1 06/11/2024     (H) 06/11/2024    MCHC 34.3 06/11/2024     06/11/2024    RDW 11.9 06/11/2024    NEUTOPHILPCT 55.3 06/11/2024    IGPCT 0.2 06/11/2024    LYMPHOPCT 29.7 06/11/2024    MONOPCT 11.0 06/11/2024    EOSPCT 3.0 06/11/2024    BASOPCT 0.8 06/11/2024    NEUTROABS 2.76 06/11/2024    LYMPHSABS 1.48 06/11/2024    MONOSABS 0.55 06/11/2024    EOSABS 0.15 06/11/2024    BASOSABS 0.04 06/11/2024     "    CMP:  Lab Results   Component Value Date    GLUCOSE 100 (H) 02/07/2025     02/07/2025    K 4.3 02/07/2025     02/07/2025    CO2 26 02/07/2025    ANIONGAP 9 02/07/2025    BUN 20 02/07/2025    CREATININE 0.79 02/07/2025    GFRF 60 04/14/2023    CALCIUM 9.4 02/07/2025    ALBUMIN 4.3 02/07/2025    ALKPHOS 61 02/07/2025    PROT 6.6 02/07/2025    AST 22 02/07/2025    BILITOT 0.4 02/07/2025    ALT 26 02/07/2025      Lipid:   Lab Results   Component Value Date    CHOL 172 02/07/2025    HDL 63 02/07/2025    CHHDL 2.7 02/07/2025    LDLF 136 (H) 04/14/2023    VLDL 26 01/18/2024    TRIG 150 (H) 02/07/2025     TSH:   Lab Results   Component Value Date    TSH 3.33 02/07/2025        B12:  Lab Results   Component Value Date    MGWIGNQT29 717 06/11/2024       Vitamin D:  Lab Results   Component Value Date    VITD25 46 06/11/2024        HgA1c:   Lab Results   Component Value Date    HGBA1C 5.5 06/11/2024    BMKFOQLH7R 111 06/11/2024       Physical Exam      9/5/2023     8:49 AM 12/13/2023     8:17 AM 1/22/2024     9:08 AM 6/11/2024     8:08 AM 10/8/2024     8:13 AM 10/10/2024     8:54 AM 2/19/2025     9:50 AM   Vitals   Systolic 108 133 132 132 125  108   Diastolic 52 82 58 62 75  52   BP Location Left arm  Left arm Right arm      Heart Rate 64 94 72 72 94  84   Resp 18  16 18 18  18   Height 1.638 m (5' 4.5\") 1.651 m (5' 5\") 1.651 m (5' 5\") 1.6 m (5' 3\") 1.6 m (5' 3\") 1.6 m (5' 2.99\") 1.6 m (5' 3\")   Weight (lb) 156 157.8 163 162 165.6 165 167   BMI 26.36 kg/m2 26.26 kg/m2 27.12 kg/m2 28.7 kg/m2 29.33 kg/m2 29.24 kg/m2 29.58 kg/m2   BSA (m2) 1.79 m2 1.81 m2 1.84 m2 1.81 m2 1.83 m2 1.82 m2 1.84 m2     General: Patient is known to me, looks well, is in usual state of health, with  no obvious distress.  Head: Normocephalic  Nose:  Pale boggy turbinates, bilaterally, up high only, consistent with allergic rhinitis.  Eyes: PERRL, EOMI, no scleral icterus or conjunctival abnormality.  Ears: Normal canals. Bilateral TMs are " retracted.     Oropharynx: Well hydrated mucosa. no lesions, erythema. palate moves well.  Neck: No masses, no cervical (A/P/ or Lateral) adenopathy. Thyroid not enlarged and no nodules. Carotid pulses normal and no bruits.  Chest: Normal AP diameter. No supraclavicular adenopathy.  Lungs: Clear to auscultation: no rales , wheezes, rhonchi, rubs, examined bilaterally, ant/post /lateral. RR normal.  Heart: RRR. No MGCR S1 S2 normal.  Abdomen: BS normal, no bruits. No hepatosplenomegaly. No abdominal mass or tenderness. No distension.  Extremities: No upper extremity edema. No lower leg edema.  No ischemia.   Joints: No synovitis seen. No deformities.  Pulses: Normal posterior tibialis pulses, normal dorsalis pedis pulses. normal radial pulses. Normal femoral pulses without bruits.  Neuro: CN 2-12 intact . Alert, oriented, appropriate.  No focal weakness observed. No tremors. Ambulation is normal .  Psych: Mood and affect normal. No cognitive deficits noted during this exam. Alert, oriented, appropriate.  Skin: No petechiae or excessive bruising.  Patient has a rash above and below her elbow, right arm, with abrasions consistent with folliculitis / eczematoid dermatitis.  Areas are pink with some excoriation marks.  Order placed for clobetasol 0.05% cream to be applied twice per day for up to 14 days.  Lymph: No SC axillary or inguinal adenopathy    Breast Exam : Symmetric appearance. No masses, nipple discharge, skin retraction, axillary or supraclavicular adenopathy.  See diagram re smooth, flat,  mobile nodule less than 1 cm on the medial right breast just medial to the areola between 2 and 3 O'clock consistent with lipoma, non worrisome; confirmed with US 10/08/2024.    Gyn: Normal pelvic exam: External Genitalia without lesions. Urethra caruncle present, see diagram below. Speculum exam: no lesions or vaginal discharge, cervix without lesions. Bimanual exam: no uterine masses. No ovarian masses. No anal/perineal  lesions. Recto vaginal exam normal. (Patient declined chaperone)  No Pap required today.    Physical Exam  Chest:        Genitourinary:          Comments: Small urethral caruncle protruding, stable compared to last exam. she has not been using the estradiol cream. Asymptomatic.  Advised to use the estradiol cream.     Radha was seen today for annual exam.  Diagnoses and all orders for this visit:  Primary hypertension (Primary)  -     CBC and Auto Differential; Future  -     POCT UA Automated manually resulted  Hyperlipidemia, unspecified hyperlipidemia type  Hypothyroidism, unspecified type  Generalized anxiety disorder  Vitamin D deficiency  -     Vitamin D 25-Hydroxy,Total (for eval of Vitamin D levels); Future  Eczema, unspecified type  Comments:  eczematoid dermaitis right arm above and below elbow-  Orders:  -     clobetasol (Temovate) 0.05 % cream; Apply topically 2 times a day. To affected area of arm for up to 14 days  Winter itch  -     clobetasol (Temovate) 0.05 % cream; Apply topically 2 times a day. To affected area of arm for up to 14 days  Bronchiolitis  Need for diphtheria-tetanus-pertussis (Tdap) vaccine  Immunization counseling  Other orders  -     Tdap vaccine, age 7 years and older  (BOOSTRIX)    See patient instructions in wrap up plan, orders and comments for treatment plan.  Patient Instructions:  Blood test not fasting in July when you obtain the CT chest.    Same medications.    Mammogram October.    Follow up after July CT chest and blood test--you do need to schedule the ct.  August /6 month, call if problems.    TDAP vaccine given today.    Colonoscopy due September fyi.  -----    I am the Internal Medicine physician providing ongoing chronic medical care for this patient, which is managed during and in between office visits.    Scribe Attestation  By signing my name below, Anne MORRISON, Scribe   attest that this documentation has been prepared under the direction and in the presence  of Ashia Meza MD.

## 2025-02-19 ENCOUNTER — APPOINTMENT (OUTPATIENT)
Dept: PRIMARY CARE | Facility: CLINIC | Age: 65
End: 2025-02-19
Payer: COMMERCIAL

## 2025-02-19 VITALS
HEART RATE: 84 BPM | HEIGHT: 63 IN | DIASTOLIC BLOOD PRESSURE: 52 MMHG | SYSTOLIC BLOOD PRESSURE: 108 MMHG | BODY MASS INDEX: 29.59 KG/M2 | WEIGHT: 167 LBS | RESPIRATION RATE: 18 BRPM

## 2025-02-19 DIAGNOSIS — E55.9 VITAMIN D DEFICIENCY: ICD-10-CM

## 2025-02-19 DIAGNOSIS — E03.9 HYPOTHYROIDISM, UNSPECIFIED TYPE: ICD-10-CM

## 2025-02-19 DIAGNOSIS — N36.2 URETHRAL CARUNCLE: ICD-10-CM

## 2025-02-19 DIAGNOSIS — L30.9 ECZEMA, UNSPECIFIED TYPE: ICD-10-CM

## 2025-02-19 DIAGNOSIS — Z23 NEED FOR DIPHTHERIA-TETANUS-PERTUSSIS (TDAP) VACCINE: ICD-10-CM

## 2025-02-19 DIAGNOSIS — I10 PRIMARY HYPERTENSION: Primary | ICD-10-CM

## 2025-02-19 DIAGNOSIS — L29.89 WINTER ITCH: ICD-10-CM

## 2025-02-19 DIAGNOSIS — Z71.85 IMMUNIZATION COUNSELING: ICD-10-CM

## 2025-02-19 DIAGNOSIS — F41.1 GENERALIZED ANXIETY DISORDER: ICD-10-CM

## 2025-02-19 DIAGNOSIS — J21.9 BRONCHIOLITIS: ICD-10-CM

## 2025-02-19 DIAGNOSIS — E78.5 HYPERLIPIDEMIA, UNSPECIFIED HYPERLIPIDEMIA TYPE: ICD-10-CM

## 2025-02-19 LAB
POC APPEARANCE, URINE: CLEAR
POC BILIRUBIN, URINE: NEGATIVE
POC BLOOD, URINE: NEGATIVE
POC COLOR, URINE: YELLOW
POC GLUCOSE, URINE: NEGATIVE MG/DL
POC KETONES, URINE: NEGATIVE MG/DL
POC LEUKOCYTES, URINE: ABNORMAL
POC NITRITE,URINE: NEGATIVE
POC PH, URINE: 5 PH
POC PROTEIN, URINE: NEGATIVE MG/DL
POC SPECIFIC GRAVITY, URINE: 1.02
POC UROBILINOGEN, URINE: 0.2 EU/DL

## 2025-02-19 RX ORDER — CLOBETASOL PROPIONATE 0.5 MG/G
CREAM TOPICAL 2 TIMES DAILY
Qty: 60 G | Refills: 0 | Status: SHIPPED | OUTPATIENT
Start: 2025-02-19

## 2025-02-19 SDOH — ECONOMIC STABILITY: FOOD INSECURITY: WITHIN THE PAST 12 MONTHS, THE FOOD YOU BOUGHT JUST DIDN'T LAST AND YOU DIDN'T HAVE MONEY TO GET MORE.: NEVER TRUE

## 2025-02-19 SDOH — ECONOMIC STABILITY: INCOME INSECURITY: IN THE LAST 12 MONTHS, WAS THERE A TIME WHEN YOU WERE NOT ABLE TO PAY THE MORTGAGE OR RENT ON TIME?: NO

## 2025-02-19 SDOH — ECONOMIC STABILITY: FOOD INSECURITY: WITHIN THE PAST 12 MONTHS, YOU WORRIED THAT YOUR FOOD WOULD RUN OUT BEFORE YOU GOT MONEY TO BUY MORE.: NEVER TRUE

## 2025-02-19 SDOH — HEALTH STABILITY: PHYSICAL HEALTH: ON AVERAGE, HOW MANY DAYS PER WEEK DO YOU ENGAGE IN MODERATE TO STRENUOUS EXERCISE (LIKE A BRISK WALK)?: 0 DAYS

## 2025-02-19 SDOH — HEALTH STABILITY: PHYSICAL HEALTH: ON AVERAGE, HOW MANY MINUTES DO YOU ENGAGE IN EXERCISE AT THIS LEVEL?: 0 MIN

## 2025-02-19 ASSESSMENT — ANXIETY QUESTIONNAIRES
GAD7 TOTAL SCORE: 0
6. BECOMING EASILY ANNOYED OR IRRITABLE: NOT AT ALL
5. BEING SO RESTLESS THAT IT IS HARD TO SIT STILL: NOT AT ALL
1. FEELING NERVOUS, ANXIOUS, OR ON EDGE: NOT AT ALL
2. NOT BEING ABLE TO STOP OR CONTROL WORRYING: NOT AT ALL
IF YOU CHECKED OFF ANY PROBLEMS ON THIS QUESTIONNAIRE, HOW DIFFICULT HAVE THESE PROBLEMS MADE IT FOR YOU TO DO YOUR WORK, TAKE CARE OF THINGS AT HOME, OR GET ALONG WITH OTHER PEOPLE: NOT DIFFICULT AT ALL
7. FEELING AFRAID AS IF SOMETHING AWFUL MIGHT HAPPEN: NOT AT ALL
3. WORRYING TOO MUCH ABOUT DIFFERENT THINGS: NOT AT ALL
4. TROUBLE RELAXING: NOT AT ALL

## 2025-02-19 ASSESSMENT — ACTIVITIES OF DAILY LIVING (ADL)
PILL BOX USED: NO
TAKING MEDICATION: INDEPENDENT
HEARING - RIGHT EAR: FUNCTIONAL
TOILETING: INDEPENDENT
FEEDING YOURSELF: INDEPENDENT
USING TELEPHONE: INDEPENDENT
GROOMING: INDEPENDENT
NEEDS ASSISTANCE WITH FOOD: INDEPENDENT
EATING: INDEPENDENT
MANAGING FINANCES: INDEPENDENT
PATIENT'S MEMORY ADEQUATE TO SAFELY COMPLETE DAILY ACTIVITIES?: YES
JUDGMENT_ADEQUATE_SAFELY_COMPLETE_DAILY_ACTIVITIES: YES
GROCERY SHOPPING: INDEPENDENT
STIL DRIVING: YES
PREPARING MEALS: INDEPENDENT
ADEQUATE_TO_COMPLETE_ADL: YES
BATHING: INDEPENDENT
DRESSING YOURSELF: INDEPENDENT
HEARING - LEFT EAR: FUNCTIONAL
DOING HOUSEWORK: INDEPENDENT
WALKS IN HOME: INDEPENDENT
USING TRANSPORTATION: INDEPENDENT

## 2025-02-19 ASSESSMENT — SOCIAL DETERMINANTS OF HEALTH (SDOH)
HOW HARD IS IT FOR YOU TO PAY FOR THE VERY BASICS LIKE FOOD, HOUSING, MEDICAL CARE, AND HEATING?: NOT HARD AT ALL
WITHIN THE LAST YEAR, HAVE TO BEEN RAPED OR FORCED TO HAVE ANY KIND OF SEXUAL ACTIVITY BY YOUR PARTNER OR EX-PARTNER?: NO
IN THE PAST 12 MONTHS, HAS THE ELECTRIC, GAS, OIL, OR WATER COMPANY THREATENED TO SHUT OFF SERVICE IN YOUR HOME?: NO
ARE YOU MARRIED, WIDOWED, DIVORCED, SEPARATED, NEVER MARRIED, OR LIVING WITH A PARTNER?: NEVER MARRIED
WITHIN THE LAST YEAR, HAVE YOU BEEN HUMILIATED OR EMOTIONALLY ABUSED IN OTHER WAYS BY YOUR PARTNER OR EX-PARTNER?: NO
HOW OFTEN DO YOU ATTENT MEETINGS OF THE CLUB OR ORGANIZATION YOU BELONG TO?: NEVER
HOW OFTEN DO YOU ATTEND CHURCH OR RELIGIOUS SERVICES?: NEVER
WITHIN THE LAST YEAR, HAVE YOU BEEN KICKED, HIT, SLAPPED, OR OTHERWISE PHYSICALLY HURT BY YOUR PARTNER OR EX-PARTNER?: NO
DO YOU BELONG TO ANY CLUBS OR ORGANIZATIONS SUCH AS CHURCH GROUPS UNIONS, FRATERNAL OR ATHLETIC GROUPS, OR SCHOOL GROUPS?: NO
WITHIN THE LAST YEAR, HAVE YOU BEEN AFRAID OF YOUR PARTNER OR EX-PARTNER?: NO
IN A TYPICAL WEEK, HOW MANY TIMES DO YOU TALK ON THE PHONE WITH FAMILY, FRIENDS, OR NEIGHBORS?: MORE THAN THREE TIMES A WEEK
HOW OFTEN DO YOU GET TOGETHER WITH FRIENDS OR RELATIVES?: MORE THAN THREE TIMES A WEEK

## 2025-02-19 ASSESSMENT — LIFESTYLE VARIABLES
SKIP TO QUESTIONS 9-10: 1
HOW MANY STANDARD DRINKS CONTAINING ALCOHOL DO YOU HAVE ON A TYPICAL DAY: 1 OR 2
HOW OFTEN DO YOU HAVE A DRINK CONTAINING ALCOHOL: 2-3 TIMES A WEEK
AUDIT-C TOTAL SCORE: 3
HOW OFTEN DO YOU HAVE SIX OR MORE DRINKS ON ONE OCCASION: NEVER

## 2025-02-19 ASSESSMENT — COLUMBIA-SUICIDE SEVERITY RATING SCALE - C-SSRS
1. IN THE PAST MONTH, HAVE YOU WISHED YOU WERE DEAD OR WISHED YOU COULD GO TO SLEEP AND NOT WAKE UP?: NO
2. HAVE YOU ACTUALLY HAD ANY THOUGHTS OF KILLING YOURSELF?: NO
6. HAVE YOU EVER DONE ANYTHING, STARTED TO DO ANYTHING, OR PREPARED TO DO ANYTHING TO END YOUR LIFE?: NO

## 2025-02-19 ASSESSMENT — PAIN SCALES - GENERAL: PAINLEVEL_OUTOF10: 0-NO PAIN

## 2025-02-19 ASSESSMENT — PATIENT HEALTH QUESTIONNAIRE - PHQ9
SUM OF ALL RESPONSES TO PHQ9 QUESTIONS 1 & 2: 0
1. LITTLE INTEREST OR PLEASURE IN DOING THINGS: NOT AT ALL
2. FEELING DOWN, DEPRESSED OR HOPELESS: NOT AT ALL

## 2025-02-19 NOTE — PATIENT INSTRUCTIONS
Blood test not fasting in July when you obtain the CT chest.    Same medications.    Mammogram October.    Follow up after July CT chest and blood test--you do need to schedule the ct.  August /6 month, call if problems.    TDAP vaccine given today.    Colonoscopy due September fyi.

## 2025-05-19 DIAGNOSIS — E55.9 VITAMIN D DEFICIENCY: ICD-10-CM

## 2025-05-19 RX ORDER — ACETAMINOPHEN 500 MG
50 TABLET ORAL DAILY
Qty: 90 CAPSULE | Refills: 3 | Status: SHIPPED | OUTPATIENT
Start: 2025-05-19

## 2025-06-10 ENCOUNTER — TELEPHONE (OUTPATIENT)
Dept: RADIOLOGY | Facility: HOSPITAL | Age: 65
End: 2025-06-10
Payer: COMMERCIAL

## 2025-06-10 NOTE — TELEPHONE ENCOUNTER
VM message left requesting patient to call and schedule the follow up Lung Cancer Screening exam. Requested they schedule with radiology @ [phone].Encouraged  to return my call @  [phone] for any additional assistance.   EPIC My Chart message was left for patient with scheduling instructions.      dependent

## 2025-06-23 ENCOUNTER — TELEPHONE (OUTPATIENT)
Dept: RADIOLOGY | Facility: HOSPITAL | Age: 65
End: 2025-06-23
Payer: COMMERCIAL

## 2025-06-23 NOTE — TELEPHONE ENCOUNTER
VM message left requesting patient to call and schedule the follow up Lung Cancer Screening exam. Requested they schedule with radiology @ [phone].Encouraged  to return my call @  [phone] for any additional assistance.

## 2025-07-01 DIAGNOSIS — E55.9 VITAMIN D DEFICIENCY: ICD-10-CM

## 2025-07-01 DIAGNOSIS — I10 PRIMARY HYPERTENSION: ICD-10-CM

## 2025-08-14 LAB
25(OH)D3+25(OH)D2 SERPL-MCNC: 48 NG/ML (ref 30–100)
BASOPHILS # BLD AUTO: 51 CELLS/UL (ref 0–200)
BASOPHILS NFR BLD AUTO: 1.1 %
EOSINOPHIL # BLD AUTO: 120 CELLS/UL (ref 15–500)
EOSINOPHIL NFR BLD AUTO: 2.6 %
ERYTHROCYTE [DISTWIDTH] IN BLOOD BY AUTOMATED COUNT: 12.2 % (ref 11–15)
HCT VFR BLD AUTO: 40 % (ref 35–45)
HGB BLD-MCNC: 13.2 G/DL (ref 11.7–15.5)
LYMPHOCYTES # BLD AUTO: 1509 CELLS/UL (ref 850–3900)
LYMPHOCYTES NFR BLD AUTO: 32.8 %
MCH RBC QN AUTO: 33.6 PG (ref 27–33)
MCHC RBC AUTO-ENTMCNC: 33 G/DL (ref 32–36)
MCV RBC AUTO: 101.8 FL (ref 80–100)
MONOCYTES # BLD AUTO: 561 CELLS/UL (ref 200–950)
MONOCYTES NFR BLD AUTO: 12.2 %
NEUTROPHILS # BLD AUTO: 2360 CELLS/UL (ref 1500–7800)
NEUTROPHILS NFR BLD AUTO: 51.3 %
PLATELET # BLD AUTO: 300 THOUSAND/UL (ref 140–400)
PMV BLD REES-ECKER: 8.9 FL (ref 7.5–12.5)
RBC # BLD AUTO: 3.93 MILLION/UL (ref 3.8–5.1)
WBC # BLD AUTO: 4.6 THOUSAND/UL (ref 3.8–10.8)

## 2025-08-18 ENCOUNTER — APPOINTMENT (OUTPATIENT)
Dept: PRIMARY CARE | Facility: CLINIC | Age: 65
End: 2025-08-18
Payer: COMMERCIAL

## 2025-08-18 VITALS
RESPIRATION RATE: 18 BRPM | WEIGHT: 161 LBS | SYSTOLIC BLOOD PRESSURE: 121 MMHG | HEART RATE: 82 BPM | DIASTOLIC BLOOD PRESSURE: 69 MMHG | BODY MASS INDEX: 27.49 KG/M2 | HEIGHT: 64 IN

## 2025-08-18 DIAGNOSIS — I10 PRIMARY HYPERTENSION: Primary | ICD-10-CM

## 2025-08-18 DIAGNOSIS — I25.10 CORONARY ARTERY CALCIFICATION SEEN ON CAT SCAN: ICD-10-CM

## 2025-08-18 DIAGNOSIS — E55.9 VITAMIN D DEFICIENCY: ICD-10-CM

## 2025-08-18 DIAGNOSIS — I10 BENIGN ESSENTIAL HYPERTENSION: ICD-10-CM

## 2025-08-18 DIAGNOSIS — D64.9 ANEMIA, UNSPECIFIED TYPE: ICD-10-CM

## 2025-08-18 DIAGNOSIS — T46.6X5A ADVERSE EFFECT OF STATIN: ICD-10-CM

## 2025-08-18 DIAGNOSIS — E78.00 ELEVATED LDL CHOLESTEROL LEVEL: ICD-10-CM

## 2025-08-18 DIAGNOSIS — Z12.31 VISIT FOR SCREENING MAMMOGRAM: ICD-10-CM

## 2025-08-18 DIAGNOSIS — E06.3 HYPOTHYROIDISM DUE TO HASHIMOTO THYROIDITIS: ICD-10-CM

## 2025-08-18 DIAGNOSIS — E03.9 HYPOTHYROIDISM, UNSPECIFIED TYPE: ICD-10-CM

## 2025-08-18 DIAGNOSIS — F41.1 GENERALIZED ANXIETY DISORDER: ICD-10-CM

## 2025-08-18 DIAGNOSIS — D17.1 BREAST LIPOMA: ICD-10-CM

## 2025-08-18 DIAGNOSIS — Z00.00 ENCOUNTER FOR ANNUAL PHYSICAL EXAM: ICD-10-CM

## 2025-08-18 DIAGNOSIS — F17.211 CIGARETTE NICOTINE DEPENDENCE IN REMISSION: ICD-10-CM

## 2025-08-18 DIAGNOSIS — Z71.85 IMMUNIZATION COUNSELING: ICD-10-CM

## 2025-08-18 DIAGNOSIS — E78.5 HYPERLIPIDEMIA, UNSPECIFIED HYPERLIPIDEMIA TYPE: ICD-10-CM

## 2025-08-18 DIAGNOSIS — D75.89 MACROCYTOSIS: ICD-10-CM

## 2025-08-18 DIAGNOSIS — E78.2 HYPERLIPIDEMIA, MIXED: ICD-10-CM

## 2025-08-18 PROCEDURE — 1160F RVW MEDS BY RX/DR IN RCRD: CPT | Performed by: INTERNAL MEDICINE

## 2025-08-18 PROCEDURE — 1159F MED LIST DOCD IN RCRD: CPT | Performed by: INTERNAL MEDICINE

## 2025-08-18 PROCEDURE — 1158F ADVNC CARE PLAN TLK DOCD: CPT | Performed by: INTERNAL MEDICINE

## 2025-08-18 PROCEDURE — 3078F DIAST BP <80 MM HG: CPT | Performed by: INTERNAL MEDICINE

## 2025-08-18 PROCEDURE — 3074F SYST BP LT 130 MM HG: CPT | Performed by: INTERNAL MEDICINE

## 2025-08-18 PROCEDURE — 1126F AMNT PAIN NOTED NONE PRSNT: CPT | Performed by: INTERNAL MEDICINE

## 2025-08-18 PROCEDURE — 3008F BODY MASS INDEX DOCD: CPT | Performed by: INTERNAL MEDICINE

## 2025-08-18 PROCEDURE — 99214 OFFICE O/P EST MOD 30 MIN: CPT | Performed by: INTERNAL MEDICINE

## 2025-08-18 PROCEDURE — 1123F ACP DISCUSS/DSCN MKR DOCD: CPT | Performed by: INTERNAL MEDICINE

## 2025-08-18 RX ORDER — LISINOPRIL 10 MG/1
10 TABLET ORAL DAILY
Qty: 90 TABLET | Refills: 1 | Status: SHIPPED | OUTPATIENT
Start: 2025-08-18

## 2025-08-18 RX ORDER — AMOXICILLIN 500 MG/1
CAPSULE ORAL
COMMUNITY
Start: 2024-12-05

## 2025-08-18 RX ORDER — PITAVASTATIN CALCIUM 1.04 MG/1
1 TABLET, FILM COATED ORAL DAILY
Qty: 90 TABLET | Refills: 1 | Status: SHIPPED | OUTPATIENT
Start: 2025-08-18

## 2025-08-18 ASSESSMENT — PAIN SCALES - GENERAL: PAINLEVEL_OUTOF10: 0-NO PAIN

## 2025-08-19 ENCOUNTER — APPOINTMENT (OUTPATIENT)
Dept: PRIMARY CARE | Facility: CLINIC | Age: 65
End: 2025-08-19
Payer: COMMERCIAL

## 2025-08-27 ENCOUNTER — HOSPITAL ENCOUNTER (OUTPATIENT)
Dept: RADIOLOGY | Facility: CLINIC | Age: 65
Discharge: HOME | End: 2025-08-27
Payer: COMMERCIAL

## 2025-08-27 DIAGNOSIS — F17.211 CIGARETTE NICOTINE DEPENDENCE IN REMISSION: ICD-10-CM

## 2025-08-27 PROCEDURE — 71271 CT THORAX LUNG CANCER SCR C-: CPT

## 2025-08-29 ENCOUNTER — OFFICE (OUTPATIENT)
Dept: URBAN - METROPOLITAN AREA CLINIC 27 | Facility: CLINIC | Age: 65
End: 2025-08-29
Payer: COMMERCIAL

## 2025-08-29 VITALS
TEMPERATURE: 97.4 F | HEIGHT: 64 IN | HEART RATE: 80 BPM | DIASTOLIC BLOOD PRESSURE: 78 MMHG | WEIGHT: 163 LBS | SYSTOLIC BLOOD PRESSURE: 127 MMHG

## 2025-08-29 DIAGNOSIS — I10 ESSENTIAL (PRIMARY) HYPERTENSION: ICD-10-CM

## 2025-08-29 DIAGNOSIS — Z80.0 FAMILY HISTORY OF MALIGNANT NEOPLASM OF DIGESTIVE ORGANS: ICD-10-CM

## 2025-08-29 DIAGNOSIS — Z86.0100 PERSONAL HISTORY OF COLON POLYPS, UNSPECIFIED: ICD-10-CM

## 2025-08-29 PROCEDURE — 99203 OFFICE O/P NEW LOW 30 MIN: CPT

## 2025-08-29 RX ORDER — ONDANSETRON 4 MG/1
TABLET, ORALLY DISINTEGRATING ORAL
Qty: 3 | Refills: 0 | Status: ACTIVE
Start: 2025-08-29

## 2025-09-05 ENCOUNTER — AMBULATORY SURGICAL CENTER (OUTPATIENT)
Dept: URBAN - METROPOLITAN AREA SURGERY 12 | Facility: SURGERY | Age: 65
End: 2025-09-05
Payer: COMMERCIAL

## 2025-09-05 VITALS
RESPIRATION RATE: 12 BRPM | DIASTOLIC BLOOD PRESSURE: 66 MMHG | SYSTOLIC BLOOD PRESSURE: 114 MMHG | HEART RATE: 75 BPM | RESPIRATION RATE: 22 BRPM | WEIGHT: 162 LBS | OXYGEN SATURATION: 94 % | SYSTOLIC BLOOD PRESSURE: 149 MMHG | HEART RATE: 90 BPM | SYSTOLIC BLOOD PRESSURE: 102 MMHG | HEIGHT: 64 IN | OXYGEN SATURATION: 97 % | RESPIRATION RATE: 17 BRPM | RESPIRATION RATE: 30 BRPM | RESPIRATION RATE: 15 BRPM | SYSTOLIC BLOOD PRESSURE: 139 MMHG | HEART RATE: 84 BPM | HEART RATE: 86 BPM | DIASTOLIC BLOOD PRESSURE: 71 MMHG | SYSTOLIC BLOOD PRESSURE: 104 MMHG | DIASTOLIC BLOOD PRESSURE: 72 MMHG | OXYGEN SATURATION: 98 % | SYSTOLIC BLOOD PRESSURE: 107 MMHG | HEART RATE: 82 BPM | HEART RATE: 87 BPM | DIASTOLIC BLOOD PRESSURE: 58 MMHG | HEART RATE: 77 BPM | SYSTOLIC BLOOD PRESSURE: 100 MMHG | DIASTOLIC BLOOD PRESSURE: 88 MMHG | SYSTOLIC BLOOD PRESSURE: 110 MMHG | RESPIRATION RATE: 19 BRPM | HEART RATE: 83 BPM | RESPIRATION RATE: 20 BRPM | DIASTOLIC BLOOD PRESSURE: 59 MMHG | RESPIRATION RATE: 16 BRPM | DIASTOLIC BLOOD PRESSURE: 62 MMHG | HEART RATE: 91 BPM | OXYGEN SATURATION: 99 % | HEART RATE: 78 BPM | SYSTOLIC BLOOD PRESSURE: 97 MMHG | DIASTOLIC BLOOD PRESSURE: 57 MMHG | TEMPERATURE: 96.5 F

## 2025-09-05 DIAGNOSIS — Z09 ENCOUNTER FOR FOLLOW-UP EXAMINATION AFTER COMPLETED TREATMEN: ICD-10-CM

## 2025-09-05 DIAGNOSIS — K64.4 RESIDUAL HEMORRHOIDAL SKIN TAGS: ICD-10-CM

## 2025-09-05 DIAGNOSIS — K62.1 RECTAL POLYP: ICD-10-CM

## 2025-09-05 DIAGNOSIS — K57.30 DIVERTICULOSIS OF LARGE INTESTINE WITHOUT PERFORATION OR ABS: ICD-10-CM

## 2025-09-05 DIAGNOSIS — K63.5 POLYP OF COLON: ICD-10-CM

## 2025-09-05 DIAGNOSIS — Z86.0102 PERSONAL HISTORY OF HYPERPLASTIC COLON POLYPS: ICD-10-CM

## 2025-09-05 DIAGNOSIS — K64.8 OTHER HEMORRHOIDS: ICD-10-CM

## 2025-09-05 PROBLEM — Z86.010 SURVEILLANCE DUE TO PRIOR COLONIC NEOPLASIA: Status: ACTIVE | Noted: 2025-09-05

## 2025-09-05 PROCEDURE — 45384 COLONOSCOPY W/LESION REMOVAL: CPT | Mod: 33 | Performed by: INTERNAL MEDICINE

## 2026-03-04 ENCOUNTER — APPOINTMENT (OUTPATIENT)
Dept: PRIMARY CARE | Facility: CLINIC | Age: 66
End: 2026-03-04
Payer: COMMERCIAL